# Patient Record
Sex: FEMALE | Race: WHITE | Employment: OTHER | URBAN - METROPOLITAN AREA
[De-identification: names, ages, dates, MRNs, and addresses within clinical notes are randomized per-mention and may not be internally consistent; named-entity substitution may affect disease eponyms.]

---

## 2017-04-04 PROBLEM — M81.0 AGE-RELATED OSTEOPOROSIS WITHOUT CURRENT PATHOLOGICAL FRACTURE: Status: ACTIVE | Noted: 2017-04-04

## 2018-01-01 ENCOUNTER — ANESTHESIA (OUTPATIENT)
Dept: SURGERY | Age: 83
DRG: 481 | End: 2018-01-01
Payer: COMMERCIAL

## 2018-01-01 ENCOUNTER — ANESTHESIA EVENT (OUTPATIENT)
Dept: SURGERY | Age: 83
DRG: 481 | End: 2018-01-01
Payer: COMMERCIAL

## 2018-01-01 ENCOUNTER — APPOINTMENT (OUTPATIENT)
Dept: GENERAL RADIOLOGY | Age: 83
DRG: 481 | End: 2018-01-01
Attending: ORTHOPAEDIC SURGERY
Payer: COMMERCIAL

## 2018-01-01 ENCOUNTER — APPOINTMENT (OUTPATIENT)
Dept: MRI IMAGING | Age: 83
DRG: 481 | End: 2018-01-01
Attending: NURSE PRACTITIONER
Payer: COMMERCIAL

## 2018-01-01 ENCOUNTER — APPOINTMENT (OUTPATIENT)
Dept: GENERAL RADIOLOGY | Age: 83
DRG: 481 | End: 2018-01-01
Attending: EMERGENCY MEDICINE
Payer: COMMERCIAL

## 2018-01-01 ENCOUNTER — HOSPITAL ENCOUNTER (INPATIENT)
Age: 83
LOS: 2 days | Discharge: NURSING, REHAB OR LONG-TERM CARE | DRG: 481 | End: 2018-11-27
Attending: EMERGENCY MEDICINE | Admitting: HOSPITALIST
Payer: COMMERCIAL

## 2018-01-01 VITALS
WEIGHT: 173 LBS | OXYGEN SATURATION: 93 % | RESPIRATION RATE: 20 BRPM | BODY MASS INDEX: 27.8 KG/M2 | DIASTOLIC BLOOD PRESSURE: 68 MMHG | HEIGHT: 66 IN | HEART RATE: 85 BPM | TEMPERATURE: 97.8 F | SYSTOLIC BLOOD PRESSURE: 107 MMHG

## 2018-01-01 DIAGNOSIS — S80.02XA CONTUSION OF LEFT KNEE, INITIAL ENCOUNTER: ICD-10-CM

## 2018-01-01 DIAGNOSIS — S72.002A CLOSED LEFT HIP FRACTURE, INITIAL ENCOUNTER (HCC): Primary | ICD-10-CM

## 2018-01-01 LAB
25(OH)D3+25(OH)D2 SERPL-MCNC: 4.2 NG/ML (ref 30–100)
ABO + RH BLD: NORMAL
ANION GAP SERPL CALC-SCNC: 5 MMOL/L (ref 7–16)
ANION GAP SERPL CALC-SCNC: 8 MMOL/L (ref 7–16)
APPEARANCE UR: ABNORMAL
APTT PPP: <20 SEC (ref 23.2–35.3)
ATRIAL RATE: 86 BPM
BACTERIA SPEC CULT: ABNORMAL
BACTERIA SPEC CULT: ABNORMAL
BACTERIA URNS QL MICRO: 0 /HPF
BASOPHILS # BLD: 0.1 K/UL (ref 0–0.2)
BASOPHILS # BLD: 0.1 K/UL (ref 0–0.2)
BASOPHILS NFR BLD: 0 % (ref 0–2)
BASOPHILS NFR BLD: 0 % (ref 0–2)
BILIRUB UR QL: ABNORMAL
BLOOD GROUP ANTIBODIES SERPL: NORMAL
BUN SERPL-MCNC: 31 MG/DL (ref 8–23)
BUN SERPL-MCNC: 36 MG/DL (ref 8–23)
CALCIUM SERPL-MCNC: 7.6 MG/DL (ref 8.3–10.4)
CALCIUM SERPL-MCNC: 8.2 MG/DL (ref 8.3–10.4)
CALCIUM SERPL-MCNC: 8.3 MG/DL (ref 8.3–10.4)
CALCULATED P AXIS, ECG09: 76 DEGREES
CALCULATED R AXIS, ECG10: 36 DEGREES
CALCULATED T AXIS, ECG11: 60 DEGREES
CASTS URNS QL MICRO: ABNORMAL /LPF
CHLORIDE SERPL-SCNC: 105 MMOL/L (ref 98–107)
CHLORIDE SERPL-SCNC: 107 MMOL/L (ref 98–107)
CO2 SERPL-SCNC: 28 MMOL/L (ref 21–32)
CO2 SERPL-SCNC: 30 MMOL/L (ref 21–32)
COLOR UR: ABNORMAL
CREAT SERPL-MCNC: 0.82 MG/DL (ref 0.6–1)
CREAT SERPL-MCNC: 1.22 MG/DL (ref 0.6–1)
DIAGNOSIS, 93000: NORMAL
DIFFERENTIAL METHOD BLD: ABNORMAL
DIFFERENTIAL METHOD BLD: ABNORMAL
EOSINOPHIL # BLD: 0 K/UL (ref 0–0.8)
EOSINOPHIL # BLD: 0.4 K/UL (ref 0–0.8)
EOSINOPHIL NFR BLD: 0 % (ref 0.5–7.8)
EOSINOPHIL NFR BLD: 3 % (ref 0.5–7.8)
EPI CELLS #/AREA URNS HPF: ABNORMAL /HPF
ERYTHROCYTE [DISTWIDTH] IN BLOOD BY AUTOMATED COUNT: 12.8 %
ERYTHROCYTE [DISTWIDTH] IN BLOOD BY AUTOMATED COUNT: 12.9 %
ERYTHROCYTE [DISTWIDTH] IN BLOOD BY AUTOMATED COUNT: 13.2 %
GLUCOSE SERPL-MCNC: 103 MG/DL (ref 65–100)
GLUCOSE SERPL-MCNC: 141 MG/DL (ref 65–100)
GLUCOSE UR STRIP.AUTO-MCNC: NEGATIVE MG/DL
HCT VFR BLD AUTO: 28.5 % (ref 35.8–46.3)
HCT VFR BLD AUTO: 30.2 % (ref 35.8–46.3)
HCT VFR BLD AUTO: 44.9 % (ref 35.8–46.3)
HGB BLD-MCNC: 10 G/DL (ref 11.7–15.4)
HGB BLD-MCNC: 14.6 G/DL (ref 11.7–15.4)
HGB BLD-MCNC: 8.9 G/DL (ref 11.7–15.4)
HGB UR QL STRIP: ABNORMAL
IMM GRANULOCYTES # BLD: 0.1 K/UL (ref 0–0.5)
IMM GRANULOCYTES # BLD: 0.1 K/UL (ref 0–0.5)
IMM GRANULOCYTES NFR BLD AUTO: 0 % (ref 0–5)
IMM GRANULOCYTES NFR BLD AUTO: 1 % (ref 0–5)
INR PPP: 0.9
KETONES UR QL STRIP.AUTO: ABNORMAL MG/DL
LEUKOCYTE ESTERASE UR QL STRIP.AUTO: NEGATIVE
LYMPHOCYTES # BLD: 2.2 K/UL (ref 0.5–4.6)
LYMPHOCYTES # BLD: 2.3 K/UL (ref 0.5–4.6)
LYMPHOCYTES NFR BLD: 19 % (ref 13–44)
LYMPHOCYTES NFR BLD: 19 % (ref 13–44)
MAGNESIUM SERPL-MCNC: 2.1 MG/DL (ref 1.8–2.4)
MCH RBC QN AUTO: 30.6 PG (ref 26.1–32.9)
MCH RBC QN AUTO: 30.8 PG (ref 26.1–32.9)
MCH RBC QN AUTO: 31.1 PG (ref 26.1–32.9)
MCHC RBC AUTO-ENTMCNC: 30.8 G/DL (ref 31.4–35)
MCHC RBC AUTO-ENTMCNC: 31.2 G/DL (ref 31.4–35)
MCHC RBC AUTO-ENTMCNC: 32.5 G/DL (ref 31.4–35)
MCV RBC AUTO: 95.5 FL (ref 79.6–97.8)
MCV RBC AUTO: 98.6 FL (ref 79.6–97.8)
MCV RBC AUTO: 99.3 FL (ref 79.6–97.8)
MM INDURATION POC: 0 MM (ref 0–5)
MM INDURATION POC: 0 MM (ref 0–5)
MONOCYTES # BLD: 0.8 K/UL (ref 0.1–1.3)
MONOCYTES # BLD: 0.9 K/UL (ref 0.1–1.3)
MONOCYTES NFR BLD: 6 % (ref 4–12)
MONOCYTES NFR BLD: 7 % (ref 4–12)
NEUTS SEG # BLD: 8.5 K/UL (ref 1.7–8.2)
NEUTS SEG # BLD: 8.7 K/UL (ref 1.7–8.2)
NEUTS SEG NFR BLD: 70 % (ref 43–78)
NEUTS SEG NFR BLD: 74 % (ref 43–78)
NITRITE UR QL STRIP.AUTO: NEGATIVE
NRBC # BLD: 0 K/UL (ref 0–0.2)
P-R INTERVAL, ECG05: 152 MS
PH UR STRIP: 5.5 [PH] (ref 5–9)
PLATELET # BLD AUTO: 179 K/UL (ref 150–450)
PLATELET # BLD AUTO: 186 K/UL (ref 150–450)
PLATELET # BLD AUTO: 210 K/UL (ref 150–450)
PMV BLD AUTO: 10.8 FL (ref 9.4–12.3)
PMV BLD AUTO: 11.2 FL (ref 9.4–12.3)
PMV BLD AUTO: 11.5 FL (ref 9.4–12.3)
POTASSIUM SERPL-SCNC: 3.8 MMOL/L (ref 3.5–5.1)
POTASSIUM SERPL-SCNC: 4.1 MMOL/L (ref 3.5–5.1)
PPD POC: NEGATIVE NEGATIVE
PPD POC: NEGATIVE NEGATIVE
PREALB SERPL-MCNC: 16.3 MG/DL (ref 18–35.7)
PROT UR STRIP-MCNC: 30 MG/DL
PROTHROMBIN TIME: 12.1 SEC (ref 11.5–14.5)
PTH-INTACT SERPL-MCNC: 219.5 PG/ML (ref 18.5–88)
Q-T INTERVAL, ECG07: 328 MS
QRS DURATION, ECG06: 80 MS
QTC CALCULATION (BEZET), ECG08: 416 MS
RBC # BLD AUTO: 2.89 M/UL (ref 4.05–5.2)
RBC # BLD AUTO: 3.04 M/UL (ref 4.05–5.2)
RBC # BLD AUTO: 4.7 M/UL (ref 4.05–5.2)
RBC #/AREA URNS HPF: ABNORMAL /HPF
SERVICE CMNT-IMP: ABNORMAL
SERVICE CMNT-IMP: ABNORMAL
SODIUM SERPL-SCNC: 141 MMOL/L (ref 136–145)
SODIUM SERPL-SCNC: 142 MMOL/L (ref 136–145)
SP GR UR REFRACTOMETRY: 1.03 (ref 1–1.02)
SPECIMEN EXP DATE BLD: NORMAL
UROBILINOGEN UR QL STRIP.AUTO: 1 EU/DL (ref 0.2–1)
VENTRICULAR RATE, ECG03: 97 BPM
WBC # BLD AUTO: 10.8 K/UL (ref 4.3–11.1)
WBC # BLD AUTO: 11.9 K/UL (ref 4.3–11.1)
WBC # BLD AUTO: 12.1 K/UL (ref 4.3–11.1)
WBC URNS QL MICRO: ABNORMAL /HPF

## 2018-01-01 PROCEDURE — 74011250636 HC RX REV CODE- 250/636: Performed by: HOSPITALIST

## 2018-01-01 PROCEDURE — 51702 INSERT TEMP BLADDER CATH: CPT | Performed by: EMERGENCY MEDICINE

## 2018-01-01 PROCEDURE — 74011250637 HC RX REV CODE- 250/637: Performed by: NURSE PRACTITIONER

## 2018-01-01 PROCEDURE — 94760 N-INVAS EAR/PLS OXIMETRY 1: CPT

## 2018-01-01 PROCEDURE — 71045 X-RAY EXAM CHEST 1 VIEW: CPT

## 2018-01-01 PROCEDURE — 97530 THERAPEUTIC ACTIVITIES: CPT

## 2018-01-01 PROCEDURE — 74011000258 HC RX REV CODE- 258: Performed by: HOSPITALIST

## 2018-01-01 PROCEDURE — 94640 AIRWAY INHALATION TREATMENT: CPT

## 2018-01-01 PROCEDURE — 74011250636 HC RX REV CODE- 250/636: Performed by: NURSE PRACTITIONER

## 2018-01-01 PROCEDURE — 77030018836 HC SOL IRR NACL ICUM -A: Performed by: ORTHOPAEDIC SURGERY

## 2018-01-01 PROCEDURE — 77010033678 HC OXYGEN DAILY

## 2018-01-01 PROCEDURE — 77030014405 HC GD ROD RMR SYNT -C: Performed by: ORTHOPAEDIC SURGERY

## 2018-01-01 PROCEDURE — 36415 COLL VENOUS BLD VENIPUNCTURE: CPT

## 2018-01-01 PROCEDURE — 80048 BASIC METABOLIC PNL TOTAL CA: CPT

## 2018-01-01 PROCEDURE — 76010000160 HC OR TIME 0.5 TO 1 HR INTENSV-TIER 1: Performed by: ORTHOPAEDIC SURGERY

## 2018-01-01 PROCEDURE — 0QS706Z REPOSITION LEFT UPPER FEMUR WITH INTRAMEDULLARY INTERNAL FIXATION DEVICE, OPEN APPROACH: ICD-10-PCS | Performed by: ORTHOPAEDIC SURGERY

## 2018-01-01 PROCEDURE — 74011000258 HC RX REV CODE- 258: Performed by: NURSE PRACTITIONER

## 2018-01-01 PROCEDURE — 73502 X-RAY EXAM HIP UNI 2-3 VIEWS: CPT

## 2018-01-01 PROCEDURE — 74011000250 HC RX REV CODE- 250: Performed by: NURSE PRACTITIONER

## 2018-01-01 PROCEDURE — 74011636637 HC RX REV CODE- 636/637: Performed by: HOSPITALIST

## 2018-01-01 PROCEDURE — 97166 OT EVAL MOD COMPLEX 45 MIN: CPT

## 2018-01-01 PROCEDURE — C1769 GUIDE WIRE: HCPCS | Performed by: ORTHOPAEDIC SURGERY

## 2018-01-01 PROCEDURE — 85025 COMPLETE CBC W/AUTO DIFF WBC: CPT

## 2018-01-01 PROCEDURE — 73721 MRI JNT OF LWR EXTRE W/O DYE: CPT

## 2018-01-01 PROCEDURE — 74011250636 HC RX REV CODE- 250/636

## 2018-01-01 PROCEDURE — 77030008467 HC STPLR SKN COVD -B: Performed by: ORTHOPAEDIC SURGERY

## 2018-01-01 PROCEDURE — 97110 THERAPEUTIC EXERCISES: CPT

## 2018-01-01 PROCEDURE — 74011000302 HC RX REV CODE- 302: Performed by: HOSPITALIST

## 2018-01-01 PROCEDURE — L1830 KO IMMOB CANVAS LONG PRE OTS: HCPCS | Performed by: ORTHOPAEDIC SURGERY

## 2018-01-01 PROCEDURE — 74011000250 HC RX REV CODE- 250: Performed by: HOSPITALIST

## 2018-01-01 PROCEDURE — 87088 URINE BACTERIA CULTURE: CPT

## 2018-01-01 PROCEDURE — 93005 ELECTROCARDIOGRAM TRACING: CPT | Performed by: EMERGENCY MEDICINE

## 2018-01-01 PROCEDURE — 77030020255 HC SOL INJ LR 1000ML BG

## 2018-01-01 PROCEDURE — 65270000029 HC RM PRIVATE

## 2018-01-01 PROCEDURE — 83970 ASSAY OF PARATHORMONE: CPT

## 2018-01-01 PROCEDURE — 87641 MR-STAPH DNA AMP PROBE: CPT

## 2018-01-01 PROCEDURE — 77030007880 HC KT SPN EPDRL BBMI -B: Performed by: ANESTHESIOLOGY

## 2018-01-01 PROCEDURE — 74011250637 HC RX REV CODE- 250/637: Performed by: HOSPITALIST

## 2018-01-01 PROCEDURE — 77030003665 HC NDL SPN BBMI -A: Performed by: ANESTHESIOLOGY

## 2018-01-01 PROCEDURE — 87086 URINE CULTURE/COLONY COUNT: CPT

## 2018-01-01 PROCEDURE — 97116 GAIT TRAINING THERAPY: CPT

## 2018-01-01 PROCEDURE — 97162 PT EVAL MOD COMPLEX 30 MIN: CPT

## 2018-01-01 PROCEDURE — 74011250636 HC RX REV CODE- 250/636: Performed by: ANESTHESIOLOGY

## 2018-01-01 PROCEDURE — 85027 COMPLETE CBC AUTOMATED: CPT

## 2018-01-01 PROCEDURE — 73552 X-RAY EXAM OF FEMUR 2/>: CPT

## 2018-01-01 PROCEDURE — 83735 ASSAY OF MAGNESIUM: CPT

## 2018-01-01 PROCEDURE — 87186 SC STD MICRODIL/AGAR DIL: CPT

## 2018-01-01 PROCEDURE — 81001 URINALYSIS AUTO W/SCOPE: CPT

## 2018-01-01 PROCEDURE — 76060000033 HC ANESTHESIA 1 TO 1.5 HR: Performed by: ORTHOPAEDIC SURGERY

## 2018-01-01 PROCEDURE — 77030002933 HC SUT MCRYL J&J -A: Performed by: ORTHOPAEDIC SURGERY

## 2018-01-01 PROCEDURE — 86901 BLOOD TYPING SEROLOGIC RH(D): CPT

## 2018-01-01 PROCEDURE — 0T9B70Z DRAINAGE OF BLADDER WITH DRAINAGE DEVICE, VIA NATURAL OR ARTIFICIAL OPENING: ICD-10-PCS | Performed by: EMERGENCY MEDICINE

## 2018-01-01 PROCEDURE — 82306 VITAMIN D 25 HYDROXY: CPT

## 2018-01-01 PROCEDURE — 77030005515 HC CATH URETH FOL14 BARD -B

## 2018-01-01 PROCEDURE — 97535 SELF CARE MNGMENT TRAINING: CPT

## 2018-01-01 PROCEDURE — C1713 ANCHOR/SCREW BN/BN,TIS/BN: HCPCS | Performed by: ORTHOPAEDIC SURGERY

## 2018-01-01 PROCEDURE — 85730 THROMBOPLASTIN TIME PARTIAL: CPT

## 2018-01-01 PROCEDURE — 73590 X-RAY EXAM OF LOWER LEG: CPT

## 2018-01-01 PROCEDURE — 84134 ASSAY OF PREALBUMIN: CPT

## 2018-01-01 PROCEDURE — 73560 X-RAY EXAM OF KNEE 1 OR 2: CPT

## 2018-01-01 PROCEDURE — 96374 THER/PROPH/DIAG INJ IV PUSH: CPT | Performed by: EMERGENCY MEDICINE

## 2018-01-01 PROCEDURE — 86580 TB INTRADERMAL TEST: CPT | Performed by: HOSPITALIST

## 2018-01-01 PROCEDURE — 99285 EMERGENCY DEPT VISIT HI MDM: CPT | Performed by: EMERGENCY MEDICINE

## 2018-01-01 PROCEDURE — 76210000016 HC OR PH I REC 1 TO 1.5 HR: Performed by: ORTHOPAEDIC SURGERY

## 2018-01-01 PROCEDURE — 77030020263 HC SOL INJ SOD CL0.9% LFCR 1000ML

## 2018-01-01 PROCEDURE — 85610 PROTHROMBIN TIME: CPT

## 2018-01-01 PROCEDURE — 74011250636 HC RX REV CODE- 250/636: Performed by: EMERGENCY MEDICINE

## 2018-01-01 PROCEDURE — 74011000250 HC RX REV CODE- 250

## 2018-01-01 DEVICE — NAIL IM L400MM DIA12MM 130DEG LNG L PROX FEM GRN TI CANN: Type: IMPLANTABLE DEVICE | Site: FEMUR | Status: FUNCTIONAL

## 2018-01-01 DEVICE — IMPLANTABLE DEVICE: Type: IMPLANTABLE DEVICE | Site: FEMUR | Status: FUNCTIONAL

## 2018-01-01 RX ORDER — SODIUM CHLORIDE 0.9 % (FLUSH) 0.9 %
5-10 SYRINGE (ML) INJECTION EVERY 8 HOURS
Status: DISCONTINUED | OUTPATIENT
Start: 2018-01-01 | End: 2018-01-01 | Stop reason: HOSPADM

## 2018-01-01 RX ORDER — LEVOTHYROXINE SODIUM 125 UG/1
125 TABLET ORAL
Status: DISCONTINUED | OUTPATIENT
Start: 2018-01-01 | End: 2018-01-01 | Stop reason: HOSPADM

## 2018-01-01 RX ORDER — BUDESONIDE 0.5 MG/2ML
500 INHALANT ORAL 2 TIMES DAILY
Qty: 30 EACH | Refills: 0 | Status: SHIPPED
Start: 2018-01-01

## 2018-01-01 RX ORDER — SODIUM CHLORIDE 0.9 % (FLUSH) 0.9 %
5-10 SYRINGE (ML) INJECTION EVERY 8 HOURS
Status: DISCONTINUED | OUTPATIENT
Start: 2018-01-01 | End: 2018-01-01 | Stop reason: SDUPTHER

## 2018-01-01 RX ORDER — EPHEDRINE SULFATE 50 MG/ML
25 INJECTION, SOLUTION INTRAVENOUS ONCE
Status: DISCONTINUED | OUTPATIENT
Start: 2018-01-01 | End: 2018-01-01

## 2018-01-01 RX ORDER — ONDANSETRON 2 MG/ML
4 INJECTION INTRAMUSCULAR; INTRAVENOUS
Status: DISCONTINUED | OUTPATIENT
Start: 2018-01-01 | End: 2018-01-01 | Stop reason: HOSPADM

## 2018-01-01 RX ORDER — PREDNISONE 10 MG/1
TABLET ORAL
Qty: 7 TAB | Refills: 0 | Status: SHIPPED
Start: 2018-01-01

## 2018-01-01 RX ORDER — HYDROMORPHONE HYDROCHLORIDE 1 MG/ML
0.5 INJECTION, SOLUTION INTRAMUSCULAR; INTRAVENOUS; SUBCUTANEOUS
Status: COMPLETED | OUTPATIENT
Start: 2018-01-01 | End: 2018-01-01

## 2018-01-01 RX ORDER — IPRATROPIUM BROMIDE AND ALBUTEROL SULFATE 2.5; .5 MG/3ML; MG/3ML
3 SOLUTION RESPIRATORY (INHALATION)
Status: DISCONTINUED | OUTPATIENT
Start: 2018-01-01 | End: 2018-01-01 | Stop reason: HOSPADM

## 2018-01-01 RX ORDER — CEFAZOLIN SODIUM IN 0.9 % NACL 2 G/50 ML
2 INTRAVENOUS SOLUTION, PIGGYBACK (ML) INTRAVENOUS
Status: DISCONTINUED | OUTPATIENT
Start: 2018-01-01 | End: 2018-01-01 | Stop reason: SDUPTHER

## 2018-01-01 RX ORDER — CEFAZOLIN SODIUM/WATER 2 G/20 ML
2 SYRINGE (ML) INTRAVENOUS ONCE
Status: COMPLETED | OUTPATIENT
Start: 2018-01-01 | End: 2018-01-01

## 2018-01-01 RX ORDER — OXYCODONE HYDROCHLORIDE 5 MG/1
5 TABLET ORAL
Status: DISCONTINUED | OUTPATIENT
Start: 2018-01-01 | End: 2018-01-01 | Stop reason: HOSPADM

## 2018-01-01 RX ORDER — BENZONATATE 100 MG/1
100 CAPSULE ORAL
Qty: 30 CAP | Refills: 0 | Status: SHIPPED
Start: 2018-01-01 | End: 2018-01-01

## 2018-01-01 RX ORDER — FERROUS SULFATE, DRIED 160(50) MG
1 TABLET, EXTENDED RELEASE ORAL
Qty: 90 TAB | Refills: 0 | Status: SHIPPED
Start: 2018-01-01

## 2018-01-01 RX ORDER — MIDAZOLAM HYDROCHLORIDE 1 MG/ML
5 INJECTION, SOLUTION INTRAMUSCULAR; INTRAVENOUS ONCE
Status: DISCONTINUED | OUTPATIENT
Start: 2018-01-01 | End: 2018-01-01 | Stop reason: HOSPADM

## 2018-01-01 RX ORDER — SODIUM CHLORIDE, SODIUM LACTATE, POTASSIUM CHLORIDE, CALCIUM CHLORIDE 600; 310; 30; 20 MG/100ML; MG/100ML; MG/100ML; MG/100ML
75 INJECTION, SOLUTION INTRAVENOUS
Status: DISPENSED | OUTPATIENT
Start: 2018-01-01 | End: 2018-01-01

## 2018-01-01 RX ORDER — SODIUM CHLORIDE, SODIUM LACTATE, POTASSIUM CHLORIDE, CALCIUM CHLORIDE 600; 310; 30; 20 MG/100ML; MG/100ML; MG/100ML; MG/100ML
75 INJECTION, SOLUTION INTRAVENOUS CONTINUOUS
Status: DISCONTINUED | OUTPATIENT
Start: 2018-01-01 | End: 2018-01-01 | Stop reason: HOSPADM

## 2018-01-01 RX ORDER — PREDNISONE 20 MG/1
20 TABLET ORAL
Status: DISCONTINUED | OUTPATIENT
Start: 2018-01-01 | End: 2018-01-01 | Stop reason: HOSPADM

## 2018-01-01 RX ORDER — PROPOFOL 10 MG/ML
INJECTION, EMULSION INTRAVENOUS
Status: DISCONTINUED | OUTPATIENT
Start: 2018-01-01 | End: 2018-01-01 | Stop reason: HOSPADM

## 2018-01-01 RX ORDER — SODIUM CHLORIDE 9 MG/ML
75 INJECTION, SOLUTION INTRAVENOUS CONTINUOUS
Status: DISCONTINUED | OUTPATIENT
Start: 2018-01-01 | End: 2018-01-01 | Stop reason: SDUPTHER

## 2018-01-01 RX ORDER — ACETAMINOPHEN 325 MG/1
650 TABLET ORAL EVERY 8 HOURS
Status: DISCONTINUED | OUTPATIENT
Start: 2018-01-01 | End: 2018-01-01 | Stop reason: HOSPADM

## 2018-01-01 RX ORDER — PROPOFOL 10 MG/ML
INJECTION, EMULSION INTRAVENOUS AS NEEDED
Status: DISCONTINUED | OUTPATIENT
Start: 2018-01-01 | End: 2018-01-01 | Stop reason: HOSPADM

## 2018-01-01 RX ORDER — BUPIVACAINE HYDROCHLORIDE 7.5 MG/ML
INJECTION, SOLUTION EPIDURAL; RETROBULBAR
Status: COMPLETED | OUTPATIENT
Start: 2018-01-01 | End: 2018-01-01

## 2018-01-01 RX ORDER — ENOXAPARIN SODIUM 100 MG/ML
30 INJECTION SUBCUTANEOUS EVERY 24 HOURS
Qty: 26 SYRINGE | Refills: 0 | Status: SHIPPED
Start: 2018-01-01

## 2018-01-01 RX ORDER — MIDAZOLAM HYDROCHLORIDE 1 MG/ML
2 INJECTION, SOLUTION INTRAMUSCULAR; INTRAVENOUS
Status: DISCONTINUED | OUTPATIENT
Start: 2018-01-01 | End: 2018-01-01 | Stop reason: HOSPADM

## 2018-01-01 RX ORDER — TRAMADOL HYDROCHLORIDE 50 MG/1
50 TABLET ORAL
Status: DISCONTINUED | OUTPATIENT
Start: 2018-01-01 | End: 2018-01-01 | Stop reason: HOSPADM

## 2018-01-01 RX ORDER — MAG HYDROX/ALUMINUM HYD/SIMETH 200-200-20
30 SUSPENSION, ORAL (FINAL DOSE FORM) ORAL
Status: DISCONTINUED | OUTPATIENT
Start: 2018-01-01 | End: 2018-01-01 | Stop reason: HOSPADM

## 2018-01-01 RX ORDER — ENOXAPARIN SODIUM 100 MG/ML
30 INJECTION SUBCUTANEOUS EVERY 24 HOURS
Status: DISCONTINUED | OUTPATIENT
Start: 2018-01-01 | End: 2018-01-01 | Stop reason: HOSPADM

## 2018-01-01 RX ORDER — DOCUSATE SODIUM 100 MG/1
100 CAPSULE, LIQUID FILLED ORAL 2 TIMES DAILY
Qty: 60 CAP | Refills: 2 | Status: SHIPPED
Start: 2018-01-01

## 2018-01-01 RX ORDER — EPHEDRINE SULFATE 50 MG/ML
INJECTION, SOLUTION INTRAVENOUS AS NEEDED
Status: DISCONTINUED | OUTPATIENT
Start: 2018-01-01 | End: 2018-01-01 | Stop reason: HOSPADM

## 2018-01-01 RX ORDER — BENZONATATE 100 MG/1
100 CAPSULE ORAL
Status: DISCONTINUED | OUTPATIENT
Start: 2018-01-01 | End: 2018-01-01 | Stop reason: HOSPADM

## 2018-01-01 RX ORDER — IPRATROPIUM BROMIDE AND ALBUTEROL SULFATE 2.5; .5 MG/3ML; MG/3ML
3 SOLUTION RESPIRATORY (INHALATION)
Qty: 30 NEBULE | Refills: 0 | Status: SHIPPED
Start: 2018-01-01

## 2018-01-01 RX ORDER — DOCUSATE SODIUM 100 MG/1
100 CAPSULE, LIQUID FILLED ORAL 2 TIMES DAILY
Status: DISCONTINUED | OUTPATIENT
Start: 2018-01-01 | End: 2018-01-01 | Stop reason: HOSPADM

## 2018-01-01 RX ORDER — HYDROCODONE BITARTRATE AND ACETAMINOPHEN 5; 325 MG/1; MG/1
2 TABLET ORAL AS NEEDED
Status: DISCONTINUED | OUTPATIENT
Start: 2018-01-01 | End: 2018-01-01 | Stop reason: HOSPADM

## 2018-01-01 RX ORDER — GUAIFENESIN 600 MG/1
1200 TABLET, EXTENDED RELEASE ORAL EVERY 12 HOURS
Status: DISCONTINUED | OUTPATIENT
Start: 2018-01-01 | End: 2018-01-01 | Stop reason: HOSPADM

## 2018-01-01 RX ORDER — SODIUM CHLORIDE 9 MG/ML
100 INJECTION, SOLUTION INTRAVENOUS CONTINUOUS
Status: DISCONTINUED | OUTPATIENT
Start: 2018-01-01 | End: 2018-01-01 | Stop reason: HOSPADM

## 2018-01-01 RX ORDER — HYDROMORPHONE HYDROCHLORIDE 2 MG/ML
0.5 INJECTION, SOLUTION INTRAMUSCULAR; INTRAVENOUS; SUBCUTANEOUS
Status: DISCONTINUED | OUTPATIENT
Start: 2018-01-01 | End: 2018-01-01 | Stop reason: HOSPADM

## 2018-01-01 RX ORDER — LIDOCAINE HYDROCHLORIDE 10 MG/ML
0.1 INJECTION INFILTRATION; PERINEURAL AS NEEDED
Status: DISCONTINUED | OUTPATIENT
Start: 2018-01-01 | End: 2018-01-01 | Stop reason: HOSPADM

## 2018-01-01 RX ORDER — HYDROMORPHONE HYDROCHLORIDE 1 MG/ML
0.5 INJECTION, SOLUTION INTRAMUSCULAR; INTRAVENOUS; SUBCUTANEOUS
Status: DISCONTINUED | OUTPATIENT
Start: 2018-01-01 | End: 2018-01-01

## 2018-01-01 RX ORDER — ACETAMINOPHEN 325 MG/1
650 TABLET ORAL
Qty: 30 TAB | Refills: 0 | Status: SHIPPED
Start: 2018-01-01

## 2018-01-01 RX ORDER — LISINOPRIL 5 MG/1
5 TABLET ORAL DAILY
Status: DISCONTINUED | OUTPATIENT
Start: 2018-01-01 | End: 2018-01-01 | Stop reason: HOSPADM

## 2018-01-01 RX ORDER — SODIUM CHLORIDE 0.9 % (FLUSH) 0.9 %
5-10 SYRINGE (ML) INJECTION AS NEEDED
Status: DISCONTINUED | OUTPATIENT
Start: 2018-01-01 | End: 2018-01-01 | Stop reason: SDUPTHER

## 2018-01-01 RX ORDER — EPHEDRINE SULFATE/0.9% NACL/PF 25 MG/5 ML
10 SYRINGE (ML) INTRAVENOUS ONCE
Status: DISCONTINUED | OUTPATIENT
Start: 2018-01-01 | End: 2018-01-01

## 2018-01-01 RX ORDER — CEFPODOXIME PROXETIL 100 MG/1
100 TABLET, FILM COATED ORAL 2 TIMES DAILY
Qty: 6 TAB | Refills: 0 | Status: SHIPPED
Start: 2018-01-01 | End: 2018-01-01

## 2018-01-01 RX ORDER — SODIUM CHLORIDE 0.9 % (FLUSH) 0.9 %
5-10 SYRINGE (ML) INJECTION AS NEEDED
Status: DISCONTINUED | OUTPATIENT
Start: 2018-01-01 | End: 2018-01-01 | Stop reason: HOSPADM

## 2018-01-01 RX ORDER — TRAMADOL HYDROCHLORIDE 50 MG/1
100 TABLET ORAL
Qty: 30 TAB | Refills: 0 | Status: SHIPPED | OUTPATIENT
Start: 2018-01-01

## 2018-01-01 RX ORDER — FERROUS SULFATE, DRIED 160(50) MG
1 TABLET, EXTENDED RELEASE ORAL
Status: DISCONTINUED | OUTPATIENT
Start: 2018-01-01 | End: 2018-01-01 | Stop reason: HOSPADM

## 2018-01-01 RX ORDER — GUAIFENESIN/DEXTROMETHORPHAN 100-10MG/5
10 SYRUP ORAL
Status: DISCONTINUED | OUTPATIENT
Start: 2018-01-01 | End: 2018-01-01

## 2018-01-01 RX ORDER — LIDOCAINE HYDROCHLORIDE 20 MG/ML
INJECTION, SOLUTION EPIDURAL; INFILTRATION; INTRACAUDAL; PERINEURAL AS NEEDED
Status: DISCONTINUED | OUTPATIENT
Start: 2018-01-01 | End: 2018-01-01 | Stop reason: HOSPADM

## 2018-01-01 RX ORDER — FENTANYL CITRATE 50 UG/ML
100 INJECTION, SOLUTION INTRAMUSCULAR; INTRAVENOUS ONCE
Status: DISCONTINUED | OUTPATIENT
Start: 2018-01-01 | End: 2018-01-01 | Stop reason: HOSPADM

## 2018-01-01 RX ORDER — BUDESONIDE 0.5 MG/2ML
500 INHALANT ORAL
Status: DISCONTINUED | OUTPATIENT
Start: 2018-01-01 | End: 2018-01-01 | Stop reason: HOSPADM

## 2018-01-01 RX ADMIN — PREDNISONE 20 MG: 20 TABLET ORAL at 08:43

## 2018-01-01 RX ADMIN — SODIUM CHLORIDE 1000 MG: 900 INJECTION, SOLUTION INTRAVENOUS at 02:42

## 2018-01-01 RX ADMIN — ENOXAPARIN SODIUM 30 MG: 100 INJECTION SUBCUTANEOUS at 08:43

## 2018-01-01 RX ADMIN — SODIUM CHLORIDE 250 ML: 900 INJECTION, SOLUTION INTRAVENOUS at 04:54

## 2018-01-01 RX ADMIN — GUAIFENESIN 1200 MG: 600 TABLET, EXTENDED RELEASE ORAL at 08:33

## 2018-01-01 RX ADMIN — SODIUM CHLORIDE, SODIUM LACTATE, POTASSIUM CHLORIDE, AND CALCIUM CHLORIDE 75 ML/HR: 600; 310; 30; 20 INJECTION, SOLUTION INTRAVENOUS at 05:12

## 2018-01-01 RX ADMIN — LEVOTHYROXINE SODIUM 125 MCG: 125 TABLET ORAL at 05:10

## 2018-01-01 RX ADMIN — GUAIFENESIN 1200 MG: 600 TABLET, EXTENDED RELEASE ORAL at 20:11

## 2018-01-01 RX ADMIN — BUDESONIDE 500 MCG: 0.5 INHALANT RESPIRATORY (INHALATION) at 09:32

## 2018-01-01 RX ADMIN — Medication 10 ML: at 13:46

## 2018-01-01 RX ADMIN — DOCUSATE SODIUM 100 MG: 100 CAPSULE, LIQUID FILLED ORAL at 18:00

## 2018-01-01 RX ADMIN — TUBERCULIN PURIFIED PROTEIN DERIVATIVE 5 UNITS: 5 INJECTION, SOLUTION INTRADERMAL at 04:40

## 2018-01-01 RX ADMIN — CALCIUM CARBONATE 500 MG (1,250 MG)-VITAMIN D3 200 UNIT TABLET 1 TABLET: at 08:33

## 2018-01-01 RX ADMIN — PROPOFOL 75 MCG/KG/MIN: 10 INJECTION, EMULSION INTRAVENOUS at 10:26

## 2018-01-01 RX ADMIN — Medication 5 ML: at 20:11

## 2018-01-01 RX ADMIN — LEVOTHYROXINE SODIUM 125 MCG: 125 TABLET ORAL at 04:37

## 2018-01-01 RX ADMIN — CALCIUM CARBONATE 500 MG (1,250 MG)-VITAMIN D3 200 UNIT TABLET 1 TABLET: at 11:36

## 2018-01-01 RX ADMIN — SODIUM CHLORIDE, SODIUM LACTATE, POTASSIUM CHLORIDE, AND CALCIUM CHLORIDE 75 ML/HR: 600; 310; 30; 20 INJECTION, SOLUTION INTRAVENOUS at 13:00

## 2018-01-01 RX ADMIN — LEVOTHYROXINE SODIUM 125 MCG: 125 TABLET ORAL at 05:48

## 2018-01-01 RX ADMIN — ONDANSETRON 4 MG: 2 INJECTION INTRAMUSCULAR; INTRAVENOUS at 08:35

## 2018-01-01 RX ADMIN — ACETAMINOPHEN 650 MG: 325 TABLET, FILM COATED ORAL at 04:37

## 2018-01-01 RX ADMIN — Medication 2 G: at 10:40

## 2018-01-01 RX ADMIN — GUAIFENESIN 1200 MG: 600 TABLET, EXTENDED RELEASE ORAL at 20:00

## 2018-01-01 RX ADMIN — GUAIFENESIN 1200 MG: 600 TABLET, EXTENDED RELEASE ORAL at 08:43

## 2018-01-01 RX ADMIN — GUAIFENESIN AND DEXTROMETHORPHAN 10 ML: 100; 10 SYRUP ORAL at 05:48

## 2018-01-01 RX ADMIN — ACETAMINOPHEN 650 MG: 325 TABLET, FILM COATED ORAL at 15:44

## 2018-01-01 RX ADMIN — BUPIVACAINE HYDROCHLORIDE 13.5 MG: 7.5 INJECTION, SOLUTION EPIDURAL; RETROBULBAR at 10:29

## 2018-01-01 RX ADMIN — ACETAMINOPHEN 650 MG: 325 TABLET, FILM COATED ORAL at 05:09

## 2018-01-01 RX ADMIN — HYDROMORPHONE HYDROCHLORIDE 0.5 MG: 1 INJECTION, SOLUTION INTRAMUSCULAR; INTRAVENOUS; SUBCUTANEOUS at 01:58

## 2018-01-01 RX ADMIN — ACETAMINOPHEN 650 MG: 325 TABLET, FILM COATED ORAL at 20:11

## 2018-01-01 RX ADMIN — OXYCODONE HYDROCHLORIDE 5 MG: 5 TABLET ORAL at 02:42

## 2018-01-01 RX ADMIN — Medication 10 ML: at 15:45

## 2018-01-01 RX ADMIN — SODIUM CHLORIDE 1000 MG: 900 INJECTION, SOLUTION INTRAVENOUS at 19:00

## 2018-01-01 RX ADMIN — BUDESONIDE 500 MCG: 0.5 INHALANT RESPIRATORY (INHALATION) at 21:03

## 2018-01-01 RX ADMIN — Medication 10 ML: at 14:00

## 2018-01-01 RX ADMIN — DOCUSATE SODIUM 100 MG: 100 CAPSULE, LIQUID FILLED ORAL at 14:04

## 2018-01-01 RX ADMIN — ACETAMINOPHEN 650 MG: 325 TABLET, FILM COATED ORAL at 13:46

## 2018-01-01 RX ADMIN — EPHEDRINE SULFATE 10 MG: 50 INJECTION, SOLUTION INTRAVENOUS at 10:59

## 2018-01-01 RX ADMIN — CALCIUM CARBONATE 500 MG (1,250 MG)-VITAMIN D3 200 UNIT TABLET 1 TABLET: at 17:33

## 2018-01-01 RX ADMIN — OXYCODONE HYDROCHLORIDE 5 MG: 5 TABLET ORAL at 20:10

## 2018-01-01 RX ADMIN — CALCIUM CARBONATE 500 MG (1,250 MG)-VITAMIN D3 200 UNIT TABLET 1 TABLET: at 14:04

## 2018-01-01 RX ADMIN — OXYCODONE HYDROCHLORIDE 5 MG: 5 TABLET ORAL at 05:01

## 2018-01-01 RX ADMIN — SODIUM CHLORIDE, SODIUM LACTATE, POTASSIUM CHLORIDE, AND CALCIUM CHLORIDE: 600; 310; 30; 20 INJECTION, SOLUTION INTRAVENOUS at 11:01

## 2018-01-01 RX ADMIN — METHYLPREDNISOLONE SODIUM SUCCINATE 40 MG: 40 INJECTION, POWDER, FOR SOLUTION INTRAMUSCULAR; INTRAVENOUS at 04:59

## 2018-01-01 RX ADMIN — DOCUSATE SODIUM 100 MG: 100 CAPSULE, LIQUID FILLED ORAL at 08:34

## 2018-01-01 RX ADMIN — ACETAMINOPHEN 650 MG: 325 TABLET, FILM COATED ORAL at 05:01

## 2018-01-01 RX ADMIN — OXYCODONE HYDROCHLORIDE 5 MG: 5 TABLET ORAL at 11:36

## 2018-01-01 RX ADMIN — CEFTRIAXONE SODIUM 1 G: 1 INJECTION, POWDER, FOR SOLUTION INTRAMUSCULAR; INTRAVENOUS at 05:10

## 2018-01-01 RX ADMIN — ENOXAPARIN SODIUM 30 MG: 100 INJECTION SUBCUTANEOUS at 08:33

## 2018-01-01 RX ADMIN — PROPOFOL 40 MG: 10 INJECTION, EMULSION INTRAVENOUS at 10:24

## 2018-01-01 RX ADMIN — DOCUSATE SODIUM 100 MG: 100 CAPSULE, LIQUID FILLED ORAL at 08:43

## 2018-01-01 RX ADMIN — CEFTRIAXONE SODIUM 1 G: 1 INJECTION, POWDER, FOR SOLUTION INTRAMUSCULAR; INTRAVENOUS at 04:56

## 2018-01-01 RX ADMIN — LISINOPRIL 5 MG: 5 TABLET ORAL at 08:43

## 2018-01-01 RX ADMIN — Medication 5 ML: at 04:38

## 2018-01-01 RX ADMIN — PREDNISONE 20 MG: 20 TABLET ORAL at 08:34

## 2018-01-01 RX ADMIN — CALCIUM CARBONATE 500 MG (1,250 MG)-VITAMIN D3 200 UNIT TABLET 1 TABLET: at 08:43

## 2018-01-01 RX ADMIN — LISINOPRIL 5 MG: 5 TABLET ORAL at 08:34

## 2018-01-01 RX ADMIN — HYDROMORPHONE HYDROCHLORIDE 0.5 MG: 1 INJECTION, SOLUTION INTRAMUSCULAR; INTRAVENOUS; SUBCUTANEOUS at 08:34

## 2018-01-01 RX ADMIN — ACETAMINOPHEN 650 MG: 325 TABLET, FILM COATED ORAL at 14:04

## 2018-01-01 RX ADMIN — Medication 10 ML: at 05:10

## 2018-01-01 RX ADMIN — OXYCODONE HYDROCHLORIDE 5 MG: 5 TABLET ORAL at 05:09

## 2018-01-01 RX ADMIN — CALCIUM CARBONATE 500 MG (1,250 MG)-VITAMIN D3 200 UNIT TABLET 1 TABLET: at 17:00

## 2018-01-01 RX ADMIN — CALCIUM CARBONATE 500 MG (1,250 MG)-VITAMIN D3 200 UNIT TABLET 1 TABLET: at 12:29

## 2018-01-01 RX ADMIN — OXYCODONE HYDROCHLORIDE 5 MG: 5 TABLET ORAL at 20:00

## 2018-01-01 RX ADMIN — BUDESONIDE 500 MCG: 0.5 INHALANT RESPIRATORY (INHALATION) at 07:34

## 2018-01-01 RX ADMIN — LIDOCAINE HYDROCHLORIDE 40 MG: 20 INJECTION, SOLUTION EPIDURAL; INFILTRATION; INTRACAUDAL; PERINEURAL at 10:23

## 2018-01-01 RX ADMIN — ACETAMINOPHEN 650 MG: 325 TABLET, FILM COATED ORAL at 20:00

## 2018-01-01 RX ADMIN — Medication 5 ML: at 20:00

## 2018-01-01 RX ADMIN — BUDESONIDE 500 MCG: 0.5 INHALANT RESPIRATORY (INHALATION) at 20:15

## 2018-01-01 RX ADMIN — CEFTRIAXONE SODIUM 1 G: 1 INJECTION, POWDER, FOR SOLUTION INTRAMUSCULAR; INTRAVENOUS at 04:37

## 2018-01-01 RX ADMIN — DOCUSATE SODIUM 100 MG: 100 CAPSULE, LIQUID FILLED ORAL at 17:33

## 2018-01-01 RX ADMIN — SODIUM CHLORIDE, SODIUM LACTATE, POTASSIUM CHLORIDE, AND CALCIUM CHLORIDE 75 ML/HR: 600; 310; 30; 20 INJECTION, SOLUTION INTRAVENOUS at 09:16

## 2018-01-01 RX ADMIN — IPRATROPIUM BROMIDE AND ALBUTEROL SULFATE 3 ML: .5; 3 SOLUTION RESPIRATORY (INHALATION) at 07:46

## 2018-11-25 PROBLEM — S72.002A CLOSED LEFT HIP FRACTURE (HCC): Status: ACTIVE | Noted: 2018-01-01

## 2018-11-25 PROBLEM — J20.9 ACUTE BRONCHITIS: Status: ACTIVE | Noted: 2018-01-01

## 2018-11-25 PROBLEM — E86.0 DEHYDRATION: Status: ACTIVE | Noted: 2018-01-01

## 2018-11-25 NOTE — PERIOP NOTES
TRANSFER - IN REPORT: 
 
Verbal report received from [facility] on [place]  being received from 7th floor,  for ordered procedure Report consisted of patients Situation, Background, Assessment and  
Recommendations(SBAR). Information from the following report(s) SBAR, Kardex, MAR, Recent Results and Pre Procedure Checklist was reviewed with the receiving nurse. Opportunity for questions and clarification was provided. Assessment completed upon patients arrival to unit and care assumed.

## 2018-11-25 NOTE — PERIOP NOTES
DR PATRICK MADE AWARE OF PTS B/P OF 87/49. DR PATRICK GAVE VERBAL ORDER FOR EPHEDRINE 10 MG IV AND 25MG IM X ONCE.

## 2018-11-25 NOTE — PERIOP NOTES
DR Esther Montes AT BEDSIDE, MADE AWARE OF PTS B/ OF 86/51 AT THIS TIME. NO NEW ORDERS AT THIS TIME.

## 2018-11-25 NOTE — PERIOP NOTES
PTS B/P 104/57 AT THIS TIME. DR PATRICK NOTIFIED.  DR PATRICK STATED TO D/ EPHEDRINE ORDERS AT THIS TIME

## 2018-11-25 NOTE — PROGRESS NOTES
11/25/18 0424 Dual Skin Pressure Injury Assessment Dual Skin Pressure Injury Assessment WDL Second Care Provider (Based on [address] [place] [address]) Birdena Meghan Skin Integumentary Skin Integumentary (WDL) X Skin Color Ecchymosis (comment); Appropriate for ethnicity Skin Condition/Temp Warm;Dry Skin Integrity Intact 
(swelling to LLE near knee, scatterred moles in armpit areas) Hair Growth Present Wound Prevention and Protection Methods Orientation of Wound Prevention Posterior Location of Wound Prevention Sacrum/Coccyx Dressing Present  No  
Wound Offloading (Prevention Methods) Bed, pressure redistribution/air;Bed, pressure reduction mattress;Pillows; Turning;Repositioning

## 2018-11-25 NOTE — ED PROVIDER NOTES
81year old lady presents with concerns about pain and deformity near her left knee after a fall. Patient says that she had no other injury and did not hit her head or lose consciousness. She does not take any blood thinners and says she otherwise has no significant medical problems. Elements of this note were created using speech recognition software. As such, errors of speech recognition may be present. No past medical history on file. No past surgical history on file. No family history on file. Social History Socioeconomic History  Marital status:  Spouse name: Not on file  Number of children: Not on file  Years of education: Not on file  Highest education level: Not on file Social Needs  Financial resource strain: Not on file  Food insecurity - worry: Not on file  Food insecurity - inability: Not on file  Transportation needs - medical: Not on file  Transportation needs - non-medical: Not on file Occupational History  Not on file Tobacco Use  Smoking status: Current Every Day Smoker Types: Cigarettes  Smokeless tobacco: Never Used Substance and Sexual Activity  Alcohol use: No  
 Drug use: No  
 Sexual activity: Not on file Other Topics Concern  Not on file Social History Narrative  Not on file ALLERGIES: Patient has no known allergies. Review of Systems Constitutional: Negative for chills and fever. Musculoskeletal: Positive for arthralgias, gait problem and joint swelling. Skin: Negative for color change and wound. Vitals:  
 11/25/18 0144 BP: 129/82 Pulse: 92 Resp: 18 Temp: 98.3 °F (36.8 °C) Weight: 78.5 kg (173 lb) Height: 5' 6\" (1.676 m) Physical Exam  
Constitutional: She is oriented to person, place, and time. She appears well-developed and well-nourished. HENT:  
Head: Normocephalic and atraumatic. Cardiovascular: Normal rate and regular rhythm. 2+ left dorsalis pedis and posterior tibials pulses Pulmonary/Chest: Effort normal and breath sounds normal.  
Musculoskeletal:  
Obvious deformity in the area of the left knee. difficult to tell what the underlying injury is Neurological: She is alert and oriented to person, place, and time. Skin: Skin is warm and dry. Nursing note and vitals reviewed. MDM Number of Diagnoses or Management Options Closed left hip fracture, initial encounter [facility]):  
Contusion of left knee, initial encounter:  
Diagnosis management comments: I will x-ray her whole left leg to evaluate the injury. X-ray reveals a left hip fracture no obvious fracture related to the knee. Her significant swelling in that area may be related to ligamentous injury. 2:52 AM 
I discussed the case with on-call for the hip fracture service. 3:03 AM 
I paged the hospitalist. 
 
3:03 AM 
I spoke with the hospitalist who kindly agreed to see the patient. Procedures

## 2018-11-25 NOTE — PERIOP NOTES
PS B/P 115/51 BEFORE ADMISTRATION OF ANY MEDICATION WHILE WAITING FOR PHARMACY TO VERIFY EPHEDRINE  AT THIS TIME, WILL RECHECK IN 5 MINS THEN NOTIFY DR PATRICK.

## 2018-11-25 NOTE — PROGRESS NOTES
TRANSFER - IN REPORT: 
 
Verbal report received from [facility] on [place]  being received from ED for routine progression of care Report consisted of patients Situation, Background, Assessment and  
Recommendations(SBAR). Information from the following report(s) SBAR, Kardex and ED Summary was reviewed with the receiving nurse. Opportunity for questions and clarification was provided. Assessment completed upon patients arrival to unit and care assumed.

## 2018-11-25 NOTE — PROGRESS NOTES
Problem: Mobility Impaired (Adult and Pediatric) Goal: *Acute Goals and Plan of Care (Insert Text) STG: 
(1.)Ms. Jennifer Cruz will move from supine to sit and sit to supine , scoot up and down and roll side to side with STAND BY ASSIST within 4 treatment day(s). (2.)Ms. Jennifer Cruz will transfer from bed to chair and chair to bed with MINIMAL ASSIST using the least restrictive device within 4 treatment day(s). (3.)Ms. Jennifer Cruz will ambulate with MINIMAL ASSIST for 5 feet with the least restrictive device NWB within 4 treatment day(s). LTG: 
(1.)Ms. Jennifer Cruz will move from supine to sit and sit to supine , scoot up and down and roll side to side in bed with SUPERVISION within 8 treatment day(s). (2.)Ms. Jennifer Cruz will transfer from bed to chair and chair to bed with STAND BY ASSIST using the least restrictive device within 8 treatment day(s). (3.)Ms. Jennifer Cruz will ambulate with STAND BY ASSIST for 25+ feet with the least restrictive device NWB within 8 treatment day(s). ________________________________________________________________________________________________ PHYSICAL THERAPY: Initial Assessment, Treatment Day: Day of Assessment, PM 11/25/2018 INPATIENT: Hospital Day: 1 Payor: [facility] / Plan: [employer] / Product Type: [employer] Medicare /  
  
NAME/AGE/GENDER: Neftali Martin is a 81 y.o. female PRIMARY DIAGNOSIS: Closed left hip fracture (HCC) Closed left hip fracture (HCC) Closed left hip fracture ([address] Procedure(s) (LRB): FEMUR INSERTION INTRA MEDULLARY NAIL (Left) Day of Surgery ICD-10: Treatment Diagnosis:  
 · Generalized Muscle Weakness (M62.81) · Difficulty in walking, Not elsewhere classified (R26.2) Precaution/Allergies: 
Patient has no known allergies. ASSESSMENT:  
 
Ms. Jennifer Cruz presents supine at arrival stating she does not have any pain. Pt in [facility] 94 with large hematoma on L inferior knee.   She agrees to participate. She was initially able to trnf to sitting, but fell back and needed 2nd attempt with CGA to complete sitting trnf. Sat EOB for 5min, then sit - stand needing John and reminders to be NWB. Pt was compliant with restriction. She stood for 6min then asking for rest sitting EOB for another 5min. Did not trnf to chair due to instructions on \"PT bed mob\" order. Pt returned to bed and applied KI. Her impairments include decreased functional mobility, decreased balance, decreased strength, decreased safety awareness, increased risk for falls. Pt would benefit from further PT while in house to address these impairments to help improve to prior level of independence. Anticipate pt will require continued skilled PT following discharge from hospital due to poor balance, safety and fall risk. Pt has 5 steps to enter house, she presents unsafe with NWB, weakness and safety would be concern at discharge. This section established at most recent assessment PROBLEM LIST (Impairments causing functional limitations): 1. Decreased Strength 2. Decreased ADL/Functional Activities 3. Decreased Transfer Abilities 4. Decreased Ambulation Ability/Technique 5. Decreased Balance 6. Decreased Activity Tolerance 7. Decreased Pacing Skills 8. Increased Fatigue 9. Increased Shortness of Breath 10. Decreased Flexibility/Joint Mobility 11. Decreased Knowledge of Precautions INTERVENTIONS PLANNED: (Benefits and precautions of physical therapy have been discussed with the patient.) 1. Balance Exercise 2. Bed Mobility 3. Gait Training 4. Neuromuscular Re-education/Strengthening 5. Range of Motion (ROM) 6. Therapeutic Activites 7. Therapeutic Exercise/Strengthening 8. Transfer Training TREATMENT PLAN: Frequency/Duration: twice daily for duration of hospital stay Rehabilitation Potential For Stated Goals: Fair RECOMMENDED REHABILITATION/EQUIPMENT: (at time of discharge pending progress): Due to the probability of continued deficits (see above) this patient will likely need continued skilled physical therapy after discharge. Equipment:  
? None at this time HISTORY:  
History of Present Injury/Illness (Reason for Referral): 
80 y/o female with hx HTN, hypothyroidism, osteoporosis, COPD, continued smoking who presents to ED with left hip and knee pain after having a fall at home 1 hr PTA. She states she was leaning down to place a new bowl of water for her cats and lost balance. Did strike head but no LOC. Not on blood thinners. Left knee very swollen. She states needs a knee replacement but no acute fracture. Does have a left intertrochanteric fracture. No chest pain or cardiac history. Has had cough with bronchitis symptoms for several days. No excess shortness of breath. Vital signs are stable. Will admit for surgical repair of left hip fracture and treat the acute bronchitis. No pneumonia on chest xray. Past Medical History/Comorbidities: Ms. Che Chicas  has no past medical history on file. Ms. Che Chicas  has no past surgical history on file. Social History/Living Environment:  
Home Environment: Private residence # Steps to Enter: 5 One/Two Story Residence: One story Living Alone: Yes Support Systems: Family member(s), Friends \ neighbors Patient Expects to be Discharged to[de-identified] Skilled nursing facility Current DME Used/Available at Home: None Prior Level of Function/Work/Activity: 
Pt reports having SPC she uses mostly outside and sometimes indoors in her R hand. Does not wonder outside home much. Number of Personal Factors/Comorbidities that affect the Plan of Care: 1-2: MODERATE COMPLEXITY EXAMINATION:  
Most Recent Physical Functioning:  
Gross Assessment: 
AROM: Generally decreased, functional 
Strength: Generally decreased, functional 
Coordination: Generally decreased, functional 
Tone: Normal 
Sensation: Intact Posture: Posture (WDL): Exceptions to [facility] Posture Assessment: Trunk flexion Balance: 
Sitting: Impaired Sitting - Static: Good (unsupported) Sitting - Dynamic: Fair (occasional) Standing: Impaired Standing - Static: Good Standing - Dynamic : Fair Bed Mobility: 
  
Wheelchair Mobility: 
  
Transfers: 
Sit to Stand: Moderate assistance Stand to Sit: Minimum assistance Gait: 
Left Side Weight Bearing: Non-weight bearing Base of Support: Center of gravity altered;Narrowed; Shift to right Stance: Weight shift Body Structures Involved: 1. Nerves 2. Bones 3. Joints 4. Muscles 5. Ligaments Body Functions Affected: 1. Mental 
2. Sensory/Pain 3. Neuromusculoskeletal 
4. Movement Related Activities and Participation Affected: 1. General Tasks and Demands 2. Mobility 3. Self Care 4. Domestic Life 5. Community, Social and [place] Number of elements that affect the Plan of Care: 3: MODERATE COMPLEXITY CLINICAL PRESENTATION:  
Presentation: Evolving clinical presentation with changing clinical characteristics: MODERATE COMPLEXITY CLINICAL DECISION [facility] [place] [address] [address] AM-PAC 6 Clicks Basic Mobility Inpatient Short Form How much difficulty does the patient currently have. .. Unable A Lot A Little None 1. Turning over in bed (including adjusting bedclothes, sheets and blankets)? [] 1   [x] 2   [] 3   [] 4  
2. Sitting down on and standing up from a chair with arms ( e.g., wheelchair, bedside commode, etc.)   [] 1   [] 2   [x] 3   [] 4  
3. Moving from lying on back to sitting on the side of the bed? [] 1   [] 2   [x] 3   [] 4 How much help from another person does the patient currently need. .. Total A Lot A Little None 4. Moving to and from a bed to a chair (including a wheelchair)? [] 1   [x] 2   [] 3   [] 4  
5. Need to walk in hospital room? [] 1   [x] 2   [] 3   [] 4  
6. Climbing 3-5 steps with a railing?    [x] 1   [] 2   [] 3   [] 4  
 © 2007, Trustees of [address] [place] [address] [address] [id number], under license to [employer]. All rights reserved Score:  Initial: 13 Most Recent: X (Date: -- ) Interpretation of Tool:  Represents activities that are increasingly more difficult (i.e. Bed mobility, Transfers, Gait). Score 24 23 22-20 19-15 14-10 9-7 6 Modifier CH CI CJ CK CL CM CN   
 
? Mobility - Walking and Moving Around:  
  - CURRENT STATUS: CL - 60%-79% impaired, limited or restricted  - GOAL STATUS: CK - 40%-59% impaired, limited or restricted  - D/C STATUS:  ---------------To be determined--------------- Payor: Lauren Samayoa / Plan: [place] [employer] / Product Type: PrePayMe Care Medicare /   
 
Medical Necessity:    
· Skilled intervention continues to be required due to decreased function. Reason for Services/Other Comments: 
· Patient continues to require skilled intervention due to medical complications and patient unable to attend/participate in therapy as expected. Use of outcome tool(s) and clinical judgement create a POC that gives a: Questionable prediction of patient's progress: MODERATE COMPLEXITY  
  
 
 
 
TREATMENT:  
(In addition to Assessment/Re-Assessment sessions the following treatments were rendered) Pre-treatment Symptoms/Complaints:  none Pain: Initial:  
Pain Intensity 1: 0  Post Session:  0 Assessment/Reassessment only, no treatment provided today Braces/Orthotics/Lines/Etc:  
· IV 
· smith catheter · O2 Device: Nasal cannula Treatment/Session Assessment:   
· Response to Treatment:  See above · Interdisciplinary Collaboration:  
o Physical Therapist 
o Registered Nurse · After treatment position/precautions:  
o Supine in bed 
o Bed alarm/tab alert on 
o Bed/Chair-wheels locked 
o Call light within reach 
o RN notified · Compliance with Program/Exercises: Will assess as treatment progresses · Recommendations/Intent for next treatment session:   \"Next visit will focus on advancements to more challenging activities and reduction in assistance provided\". Total Treatment Duration: PT Patient Time In/Time Out Time In: 1410 Time Out: 1450 LILY MontoyaT

## 2018-11-25 NOTE — ANESTHESIA POSTPROCEDURE EVALUATION
Procedure(s): FEMUR INSERTION INTRA MEDULLARY NAIL. Anesthesia Post Evaluation Multimodal analgesia: multimodal analgesia not used between 6 hours prior to anesthesia start to PACU discharge Patient location during evaluation: bedside Patient participation: complete - patient participated Level of consciousness: awake and alert Pain score: 3 Pain management: adequate Airway patency: patent Anesthetic complications: no 
Cardiovascular status: acceptable and hemodynamically stable Respiratory status: acceptable Hydration status: acceptable Visit Vitals /53 Pulse 77 Temp 36.8 °C (98.2 °F) Resp 14 Ht 5' 6\" (1.676 m) Wt 78.5 kg (173 lb) SpO2 96% BMI 27.92 kg/m²

## 2018-11-25 NOTE — ANESTHESIA PREPROCEDURE EVALUATION
Anesthetic History Review of Systems / Medical History Patient summary reviewed and pertinent labs reviewed Pulmonary COPD: moderate Smoker Neuro/Psych Cardiovascular Hypertension: well controlled Exercise tolerance: >4 METS 
  
GI/Hepatic/Renal 
  
 
 
Renal disease: CRI Endo/Other Hypothyroidism: well controlled Other Findings Physical Exam 
 
Airway Mallampati: II 
TM Distance: 4 - 6 cm Neck ROM: normal range of motion Mouth opening: Normal 
 
 Cardiovascular Regular rate and rhythm,  S1 and S2 normal,  no murmur, click, rub, or gallop Dental 
 
Dentition: Poor dentition Pulmonary Rhonchi:bilateral 
 
 
 
 
 
 Abdominal 
 
 
 
 Other Findings Anesthetic Plan ASA: 3, emergent Anesthesia type: spinal 
 
 
 
 
 
Anesthetic plan and risks discussed with: Patient

## 2018-11-25 NOTE — ANESTHESIA PROCEDURE NOTES
Spinal Block Start time: 11/25/2018 10:25 AM 
End time: 11/25/2018 10:30 AM 
Performed by: Monica Shah MD 
Authorized by: Monica Shah MD  
 
Pre-procedure: Indications: at surgeon's request and primary anesthetic  Preanesthetic Checklist: patient identified, risks and benefits discussed, anesthesia consent, site marked, patient being monitored and timeout performed Timeout Time: 10:24 Spinal Block:  
Patient Position:  Left lateral decubitus Prep Region:  Lumbar Prep: DuraPrep Location:  L2-3 Technique:  Single shot Local Dose (mL):  3 Needle:  
Needle Type:  Quincke Needle Gauge:  22 G Attempts:  1 Events: CSF confirmed, no blood with aspiration and no paresthesia Assessment: 
Insertion:  Uncomplicated Patient tolerance:  Patient tolerated the procedure well with no immediate complications

## 2018-11-25 NOTE — PERIOP NOTES
PTS B/P 98/46, DR PATRICK AT BEDSIDE, GAVE VERBAL ORDER TO HOLD EPHEDRINE AT THIS TIME UNLESS PTS SYSTOLIC B/P DROPS BELOW 90 AGAIN.

## 2018-11-25 NOTE — ED NOTES
TRANSFER - OUT REPORT: 
 
Verbal report given to Devin Mendosa RN on Yoselin Parker  being transferred to [address] [place] [address] for routine progression of care Report consisted of patients Situation, Background, Assessment and  
Recommendations(SBAR). Information from the following report(s) SBAR was reviewed with the receiving nurse. Lines:  
Peripheral IV 11/25/18 Left Antecubital (Active) Site Assessment Clean, dry, & intact 11/25/2018  1:45 AM  
Phlebitis Assessment 0 11/25/2018  1:45 AM  
Infiltration Assessment 0 11/25/2018  1:45 AM  
Dressing Status Clean, dry, & intact 11/25/2018  1:45 AM  
Dressing Type Transparent 11/25/2018  1:45 AM  
Hub Color/Line Status Green;Flushed;Capped 11/25/2018  1:45 AM  
Action Taken Blood drawn 11/25/2018  1:45 AM  
  
 
Opportunity for questions and clarification was provided. Patient transported with: 
 [employer]

## 2018-11-25 NOTE — H&P
Hospitalist H&P/Consult Note Admit Date:  2018  1:47 AM  
Name:  Paige Cespedes Age:  81 y.o. 
:  1935 MRN:  [id number] PCP:  Lupe Barahona MD 
Treatment Team: Attending Provider: Moses Cordero MD; Primary Nurse: Smiley Albrecht RN 
 
HPI:  
Patient is an 80 y/o female with hx HTN, hypothyroidism, osteoporosis, COPD, continued smoking who presents to ED with left hip and knee pain after having a fall at home 1 hr PTA. She states she was leaning down to place a new bowl of water for her cats and lost balance. Did strike head but no LOC. Not on blood thinners. Left knee very swollen. She states needs a knee replacement but no acute fracture. Does have a left intertrochanteric fracture. No chest pain or cardiac history. Has had cough with bronchitis symptoms for several days. No excess shortness of breath. Vital signs are stable. Will admit for surgical repair of left hip fracture and treat the acute bronchitis. No pneumonia on chest xray. 10 systems reviewed and negative except as noted in HPI. No fever or chills No past medical history on file. HTN, COPD, hypothyroid. No cardiac hx No past surgical history on file. ovary benign, s/p chema Prior to Admission Medications Prescriptions Last Dose Informant Patient Reported? Taking?  
aspirin delayed-release 81 mg tablet   Yes No  
Sig: Take  by mouth daily. levothyroxine (SYNTHROID) 125 mcg tablet   No No  
Sig: Take 1 Tab by mouth Daily (before breakfast). lisinopril (PRINIVIL, ZESTRIL) 5 mg tablet   No No  
Sig: Take 1 Tab by mouth daily. Facility-Administered Medications: None No Known Allergies Social History Tobacco Use  Smoking status: Current Every Day Smoker Types: Cigarettes  Smokeless tobacco: Never Used Substance Use Topics  Alcohol use: No  
  
No family history on file. no pertinent fam hx Immunization History Administered Date(s) Administered  Influenza Vaccine 04/10/2015, 11/12/2015  Influenza Vaccine (Quad) Mdck Pf 11/21/2017  Influenza Vaccine (Quad) PF 01/31/2017, 09/11/2018  Pneumococcal Conjugate (PCV-13) 08/08/2017  Pneumococcal Polysaccharide (PPSV-23) 03/10/2015, 07/18/2016 Objective:  
 
Patient Vitals for the past 24 hrs: 
 Temp Pulse Resp BP SpO2  
11/25/18 0337  72 17  90 % 11/25/18 0144 98.3 °F (36.8 °C) 92 18 129/82  Oxygen Therapy O2 Sat (%): 90 % (11/25/18 0337) Pulse via Oximetry: 64 beats per minute (11/25/18 0337) Intake/Output Summary (Last 24 hours) at 11/25/2018 8909 Last data filed at 11/25/2018 2377 Gross per 24 hour Intake 10 ml Output 100 ml Net -90 ml Physical Exam: 
General:    Well nourished. Alert and oriented. Eyes:   Normal sclera. Extraocular movements intact. ENT:  Normocephalic, atraumatic. Moist mucous membranes CV:   Regular rate and rhythm. No murmur, rub, or gallop. Lungs:  Coarse breath sounds with congested cough Abdomen: Soft, nontender, nondistended. Bowel sounds normal.  
Extremities: Warm and dry. No cyanosis or edema. Tender left hip. Neurovascularly intact. Swollen left knee Neurologic: CN II-XII grossly intact. Sensation intact. Skin:     No rashes or jaundice. No wounds. Psych:  Normal mood and affect. I reviewed the labs, imaging, EKGs, telemetry, and other studies done this admission. Data Review:  
Recent Results (from the past 24 hour(s)) CBC W/O DIFF Collection Time: 11/25/18  1:51 AM  
Result Value Ref Range WBC 10.8 4.3 - 11.1 K/uL  
 RBC 4.70 4.05 - 5.2 M/uL  
 HGB 14.6 11.7 - 15.4 g/dL HCT 44.9 35.8 - 46.3 % MCV 95.5 79.6 - 97.8 FL  
 MCH 31.1 26.1 - 32.9 PG  
 MCHC 32.5 31.4 - 35.0 g/dL  
 RDW 12.8 % PLATELET 971 697 - 483 K/uL MPV 11.5 9.4 - 12.3 FL ABSOLUTE NRBC 0.00 0.0 - 0.2 K/uL METABOLIC PANEL, BASIC Collection Time: 11/25/18  1:51 AM  
Result Value Ref Range  Sodium 141 136 - 145 mmol/L  
 Potassium 3.8 3.5 - 5.1 mmol/L Chloride 105 98 - 107 mmol/L  
 CO2 28 21 - 32 mmol/L Anion gap 8 7 - 16 mmol/L Glucose 141 (H) 65 - 100 mg/dL BUN 31 (H) 8 - 23 MG/DL Creatinine 1.22 (H) 0.6 - 1.0 MG/DL  
 GFR est AA 54 (L) >60 ml/min/1.73m2 GFR est non-AA 45 (L) >60 ml/min/1.73m2 Calcium 8.3 8.3 - 10.4 MG/DL  
PTT Collection Time: 11/25/18  1:51 AM  
Result Value Ref Range aPTT <20.0 (L) 23.2 - 35.3 SEC PROTHROMBIN TIME + INR Collection Time: 11/25/18  1:51 AM  
Result Value Ref Range Prothrombin time 12.1 11.5 - 14.5 sec INR 0.9 PREALBUMIN Collection Time: 11/25/18  3:15 AM  
Result Value Ref Range Prealbumin 16.3 (L) 18.0 - 35.7 MG/DL  
PTH INTACT Collection Time: 11/25/18  3:15 AM  
Result Value Ref Range Calcium 8.2 (L) 8.3 - 10.4 MG/DL  
 PTH, Intact PENDING pg/mL URINALYSIS W/ RFLX MICROSCOPIC Collection Time: 11/25/18  3:19 AM  
Result Value Ref Range Color JEFRY Appearance TURBID Specific gravity 1.026 (H) 1.001 - 1.023    
 pH (UA) 5.5 5.0 - 9.0 Protein 30 (A) NEG mg/dL Glucose NEGATIVE  mg/dL Ketone TRACE (A) NEG mg/dL Bilirubin SMALL (A) NEG Blood LARGE (A) NEG Urobilinogen 1.0 0.2 - 1.0 EU/dL Nitrites NEGATIVE  NEG Leukocyte Esterase NEGATIVE  NEG    
 WBC 5-10 0 /hpf  
 RBC 10-20 0 /hpf Epithelial cells 0-3 0 /hpf Bacteria 0 0 /hpf Casts 10-20 0 /lpf Imaging Studies: CXR Results  (Last 48 hours)  
          
 11/25/18 0241  XR CHEST SNGL V Final result Impression:  IMPRESSION: Minimal linear atelectasis of the left lower lobe. Narrative:  EXAM:  XR CHEST SNGL V  
   
INDICATION:  pre op COMPARISON:  None. FINDINGS: A portable AP radiograph of the chest was obtained at 0219 hours. The  
patient is on a cardiac monitor. Calcified granuloma in the right lower lobe. Minimal linear atelectasis of the left lower lobe.   The cardiac and mediastinal  
 contours and pulmonary vascularity are normal.  The bones and soft tissues are  
grossly within normal limits. CT Results  (Last 48 hours) None Assessment and Plan:  
 
Hospital Problems as of 11/25/2018 Date Reviewed: 9/11/2018 Codes Class Noted - Resolved POA * (Principal) Closed left hip fracture ([address] ICD-10-CM: V39.162X ICD-9-CM: 820.8  11/25/2018 - Present Yes Acute bronchitis ICD-10-CM: J20.9 ICD-9-CM: 466.0  11/25/2018 - Present Yes Dehydration ICD-10-CM: E86.0 ICD-9-CM: 276.51  11/25/2018 - Present Yes COPD (chronic obstructive pulmonary disease) (HCC) ICD-10-CM: J44.9 ICD-9-CM: 468  3/10/2015 - Present Yes Osteoporosis ICD-10-CM: M81.0 ICD-9-CM: 733.00  5/16/2016 - Present Yes Hypothyroidism ICD-10-CM: E03.9 ICD-9-CM: 244.9  3/10/2015 - Present Yes HTN (hypertension) ICD-10-CM: I10 
ICD-9-CM: 401.9  3/10/2015 - Present Yes Smoker ICD-10-CM: O49.744 ICD-9-CM: 305.1  3/10/2015 - Present Yes PLAN: 
· Admit to ortho unit · Hip fracture order set utilized · Pain control · Bedrest 
· Hold antiplatelets/AC 
· Ortho consulted · Continue other home meds as ordered · Patient has no hx ischemic heart disease, diabetes, CHF. No hx problems with anesthesia. Does have COPD and some mild acute bronchitis. Will treat bronchitis with duonebs, rocephin, steroids and robitussin DM. Give IV fluids for dehydration. No other interventions required prior to surgery. FEN:  NPO prior to surgery DVT ppx:  SCDs until after surgery Estimated LOS:  3 days Anticipated DC needs:  Ppd ordered. CM consulted. PT/OT evals after surgery Risk:  high Signed: 
Mira Hinson MD

## 2018-11-25 NOTE — PROGRESS NOTES
Hospitalist Progress Note Subjective:  
Daily Progress Note: 11/25/2018 5:14 PM 
 
Patient presented to ER early this am after she tripped while getting water for her animals, falling to floor and landing on her side with hip and knee pain. Left knee with immediate pain, deformity, notable edema  and inability to bear weight. Xray indicated no knee fracture, however she was found with left comminuted, displaced intertrochanteric fracture of the hip. Right knee without acute findings. Also noted with notable COPD with congested breath sounds and cough, bronchitis. Begun on rocephin. Long time smoker. Underwent insertion of intramedullary nail left femur this am per Dr Dr Simon Bates, did well intraoperatively and in the immediate post op period with stable vitals, pain controlled and no nausea. She was found by nurses smoking in bed. Contrary. To MRI for knee late in the day. ADDITIONAL HISTORY:  Long standing, ongoing tobacco abuse, Hypertension, COPD, Hypothyroidism, cholecystectomy, CRI Current Facility-Administered Medications Medication Dose Route Frequency  tuberculin injection 5 Units  5 Units IntraDERMal ONCE  
 levothyroxine (SYNTHROID) tablet 125 mcg  125 mcg Oral ACB  lisinopril (PRINIVIL, ZESTRIL) tablet 5 mg  5 mg Oral DAILY  albuterol-ipratropium (DUO-NEB) 2.5 MG-0.5 MG/3 ML  3 mL Nebulization Q4H PRN  
 oxyCODONE IR (ROXICODONE) tablet 5 mg  5 mg Oral Q4H PRN  
 traMADol (ULTRAM) tablet 50 mg  50 mg Oral Q6H PRN  
 cefTRIAXone (ROCEPHIN) 1 g in 0.9% sodium chloride (MBP/ADV) 50 mL  1 g IntraVENous Q24H  
 guaiFENesin-dextromethorphan (ROBITUSSIN DM) 100-10 mg/5 mL syrup 10 mL  10 mL Oral Q6H PRN  
 [START ON 11/26/2018] predniSONE (DELTASONE) tablet 20 mg  20 mg Oral DAILY WITH BREAKFAST  lactated Ringers infusion  75 mL/hr IntraVENous ON CALL TO OR  
 lactated Ringers infusion  75 mL/hr IntraVENous CONTINUOUS  
  sodium chloride (NS) flush 5-10 mL  5-10 mL IntraVENous Q8H  
 sodium chloride (NS) flush 5-10 mL  5-10 mL IntraVENous PRN  
 ceFAZolin (ANCEF) 1,000 mg in 0.9% sodium chloride (MBP/ADV) 50 mL ADV  1 g IntraVENous Q8H  
 acetaminophen (TYLENOL) tablet 650 mg  650 mg Oral Q8H  
 ondansetron (ZOFRAN) injection 4 mg  4 mg IntraVENous Q4H PRN  
 docusate sodium (COLACE) capsule 100 mg  100 mg Oral BID  alum-mag hydroxide-simeth (MYLANTA) oral suspension 30 mL  30 mL Oral Q4H PRN  
 calcium-vitamin D (OS-YEISON) 500 mg-200 unit tablet  1 Tab Oral TID WITH MEALS  
 [START ON 2018] enoxaparin (LOVENOX) injection 30 mg  30 mg SubCUTAneous Q24H Review of Systems A comprehensive review of systems was negative except for that written in the HPI. Objective:  
 
Visit Vitals BP 99/64 (BP 1 Location: Left arm, BP Patient Position: At rest) Pulse (!) 102 Temp 98.4 °F (36.9 °C) Resp 18 Ht 5' 6\" (1.676 m) Wt 78.5 kg (173 lb) SpO2 90% BMI 27.92 kg/m² O2 Flow Rate (L/min): 3 l/min O2 Device: Nasal cannula Temp (24hrs), Av.3 °F (36.8 °C), Min:97.1 °F (36.2 °C), Max:99.6 °F (37.6 °C) 
 
 07 -  1900 In: 1440 [P.O.:240; I.V.:1200] Out: 350 [Urine:300] 1901 -  0700 In: 10 Out: 100 [Urine:100] General appearance: Oriented and alert, cooperative, eating and drinking. Pain is controlled. Head: Normocephalic, without obvious abnormality, atraumatic Eyes: conjunctivae/corneas clear. PERRL Throat: normal findings: lips normal without lesions Neck: supple, symmetrical, trachea midline, and no JVD Lungs: clear to auscultation bilaterally Heart: regular rate and rhythm, S1, S2 normal, no murmur, click, rub or gallop Abdomen: soft, non-tender. Bowel sounds normal. No masses,  no organomegaly Extremities: extremities normal, atraumatic, no cyanosis or edema Skin: Skin color, texture, turgor normal. No rashes or lesions Neurologic: Grossly normal 
 
 Additional comments: Notes,orders, test results, vitals reviewed Data Review Recent Results (from the past 24 hour(s)) CBC W/O DIFF Collection Time: 11/25/18  1:51 AM  
Result Value Ref Range WBC 10.8 4.3 - 11.1 K/uL  
 RBC 4.70 4.05 - 5.2 M/uL  
 HGB 14.6 11.7 - 15.4 g/dL HCT 44.9 35.8 - 46.3 % MCV 95.5 79.6 - 97.8 FL  
 MCH 31.1 26.1 - 32.9 PG  
 MCHC 32.5 31.4 - 35.0 g/dL  
 RDW 12.8 % PLATELET 668 382 - 649 K/uL MPV 11.5 9.4 - 12.3 FL ABSOLUTE NRBC 0.00 0.0 - 0.2 K/uL METABOLIC PANEL, BASIC Collection Time: 11/25/18  1:51 AM  
Result Value Ref Range Sodium 141 136 - 145 mmol/L Potassium 3.8 3.5 - 5.1 mmol/L Chloride 105 98 - 107 mmol/L  
 CO2 28 21 - 32 mmol/L Anion gap 8 7 - 16 mmol/L Glucose 141 (H) 65 - 100 mg/dL BUN 31 (H) 8 - 23 MG/DL Creatinine 1.22 (H) 0.6 - 1.0 MG/DL  
 GFR est AA 54 (L) >60 ml/min/1.73m2 GFR est non-AA 45 (L) >60 ml/min/1.73m2 Calcium 8.3 8.3 - 10.4 MG/DL  
PTT Collection Time: 11/25/18  1:51 AM  
Result Value Ref Range aPTT <20.0 (L) 23.2 - 35.3 SEC PROTHROMBIN TIME + INR Collection Time: 11/25/18  1:51 AM  
Result Value Ref Range Prothrombin time 12.1 11.5 - 14.5 sec INR 0.9 EKG, 12 LEAD, INITIAL Collection Time: 11/25/18  2:03 AM  
Result Value Ref Range Ventricular Rate 97 BPM  
 Atrial Rate 86 BPM  
 P-R Interval 152 ms QRS Duration 80 ms  
 Q-T Interval 328 ms QTC Calculation (Bezet) 416 ms Calculated P Axis 76 degrees Calculated R Axis 36 degrees Calculated T Axis 60 degrees Diagnosis    
  !! AGE AND GENDER SPECIFIC ECG ANALYSIS !! Sinus rhythm with Premature atrial complexes Inferior infarct , age undetermined Cannot rule out Anterior infarct , age undetermined Abnormal ECG No previous ECGs available Confirmed by Thi Harris ([id number]) on 11/25/2018 8:20:34 AM 
  
TYPE & SCREEN Collection Time: 11/25/18  2:57 AM  
Result Value Ref Range Crossmatch Expiration 11/28/2018 ABO/Rh(D) A NEGATIVE Antibody screen NEG PREALBUMIN Collection Time: 11/25/18  3:15 AM  
Result Value Ref Range Prealbumin 16.3 (L) 18.0 - 35.7 MG/DL  
PTH INTACT Collection Time: 11/25/18  3:15 AM  
Result Value Ref Range Calcium 8.2 (L) 8.3 - 10.4 MG/DL  
 PTH, Intact 219.5 (H) 18.5 - 88.0 pg/mL URINALYSIS W/ RFLX MICROSCOPIC Collection Time: 11/25/18  3:19 AM  
Result Value Ref Range Color JEFRY Appearance TURBID Specific gravity 1.026 (H) 1.001 - 1.023    
 pH (UA) 5.5 5.0 - 9.0 Protein 30 (A) NEG mg/dL Glucose NEGATIVE  mg/dL Ketone TRACE (A) NEG mg/dL Bilirubin SMALL (A) NEG Blood LARGE (A) NEG Urobilinogen 1.0 0.2 - 1.0 EU/dL Nitrites NEGATIVE  NEG Leukocyte Esterase NEGATIVE  NEG    
 WBC 5-10 0 /hpf  
 RBC 10-20 0 /hpf Epithelial cells 0-3 0 /hpf Bacteria 0 0 /hpf Casts 10-20 0 /lpf MSSA/MRSA SC BY PCR, NASAL SWAB Collection Time: 11/25/18  4:42 AM  
Result Value Ref Range Special Requests: NO SPECIAL REQUESTS Culture result: (A) MRSA target DNA not detected, SA target DNA detected. A MRSA negative, SA positive test result does not preclude MRSA nasal colonization. LEFT FEMUR:  2  views of the left femur demonstrate a comminuted, displaced 
intertrochanteric fracture of the proximal femur. The mid and distal femur are intact. The knee is anatomically aligned. The soft tissues are normal.. IMPRESSION: Intertrochanteric fracture. LEFT TIBIA AND FIBULA: Tibia and fibula are intact. Ankles anatomically aligned. Soft tissues are normal. Plantar calcaneal spur. IMPRESSION:  No fractures are identified. LEFT HIP:  An AP view of the pelvis and a frogleg lateral view of the left hip demonstrate a comminuted, displaced intertrochanteric fracture. Remaining pelvic 
bones are intact. Chon Ortiz IMPRESSION: Comminuted, displaced intertrochanteric fracture. 
  
 CXR:  A portable AP radiograph of the chest was obtained at 0219 hours. The patient is on a cardiac monitor. Calcified granuloma in the right lower lobe. Minimal linear atelectasis of the left lower lobe. The cardiac and mediastinal contours and pulmonary vascularity are normal.  The bones and soft tissues are grossly within normal limits. IMPRESSION: Minimal linear atelectasis of the left lower lobe. 
  
LEFT KNEE: Three views of the left knee demonstrate a chronic, benign deformity of the proximal tibia. Degenerative changes of the DIP. No evidence of a joint 
effusion. [facility] IMPRESSION: No acute abnormalities. Assessment/Plan:  
Mechanical fall Comminuted, displaced intertrochanteric fracture left hip. S/P insertion intra medullary left femur per Dr Elizabeth Gregg 11/25 Routine fracture post op orders Acute left knee sprain sustained in fall MRI Pending Hypothyroidism Home meds HTN 
  home meds COPD Nebs prn Long standing tobacco abuse May need patch Osteoporosis Dehydration Continue IVF Acute bronchitis Nebs and mucinex, tessalon prn, monitor Acute UTI Begin rocephin  
 Urine culture pending Care Plan discussed with: Patient and Nurse Signed By: Lindsey Garza NP November 25, 2018

## 2018-11-25 NOTE — PROGRESS NOTES
Patient caught smoking in her room. Patient's cigarettes and medication bottles taken and placed in locked pt medication cart

## 2018-11-25 NOTE — PERIOP NOTES
TRANSFER - OUT REPORT: 
 
Verbal report given to CARMEN(name) on Viola Basurto  being transferred to [facility](unit) for routine post - op Report consisted of patients Situation, Background, Assessment and  
Recommendations(SBAR). Information from the following report(s) SBAR, Kardex, OR Summary, Procedure Summary, Intake/Output and MAR was reviewed with the receiving nurse. Lines:  
Peripheral IV 11/25/18 Posterior;Right Hand (Active) Site Assessment Clean, dry, & intact 11/25/2018 11:46 AM  
Phlebitis Assessment 0 11/25/2018 11:46 AM  
Infiltration Assessment 0 11/25/2018 11:46 AM  
Dressing Status Clean, dry, & intact 11/25/2018 11:46 AM  
Dressing Type Transparent;Tape 11/25/2018 11:46 AM  
Hub Color/Line Status Infusing;Pink 11/25/2018 11:46 AM  
Alcohol Cap Used No 11/25/2018 11:46 AM  
  
 
Opportunity for questions and clarification was provided. Patient transported with: 
 O2 @ 3 liters VTE prophylaxis orders have been written for Viola Basurto. Patient and family given floor number and nurses name. Family updated re: pt status after security code verified.

## 2018-11-25 NOTE — CONSULTS
ORTHO; 
 
PATIENT SCHEDULED FOR SURGERY Sunday AT 9:30 AM WITH DR Marycarmen Garza 
 
ORDERS IN CHART

## 2018-11-26 NOTE — PROGRESS NOTES
Physical Therapy Note: 
 
New orders received for [facility]AT LLE after x-rays reviewed. Assisted physical therapist assistant with transfer this PM to determine if goals need to be updated. Patient exhibits improved ambulation and transfers with change in weight bearing status, requiring minimal to moderate assistance to ambulate 7' with rolling walker. Updated goals are as follows: STG Goals Updated 11/26/18: 
(1.)Ms. Lorrin Claude will move from supine to sit and sit to supine , scoot up and down and roll side to side with STAND BY ASSIST within 3 treatment day(s). (2.)Ms. Lorrin Claude will transfer from bed to chair and chair to bed with CONTACT GUARD ASSIST using the least restrictive device within 3 treatment day(s). (3.)Ms. Lorrin Claude will ambulate with MINIMAL ASSIST for 40 feet with the least restrictive device within 3 treatment day(s). (4.)Ms. Lorrin Claude will perform standing static and dynamic balance activities x 15 minutes with MINIMAL ASSIST to improve safety within 3 day(s). (5.)Ms. Lorrin Claude will perform LE exercises with 1 to 2 cues for form within 3 days to improve strength for functional transfers and ambulation. LTG: 
(1.)Ms. Lorrin Claude will move from supine to sit and sit to supine , scoot up and down and roll side to side in bed with SUPERVISION within 7 treatment day(s). (2.)Ms. Lorrin Claude will transfer from bed to chair and chair to bed with STAND BY ASSIST using the least restrictive device within 7 treatment day(s). (3.)Ms. Lorrin Claude will ambulate with CONTACT GUARD ASSIST for 75 feet with the least restrictive device within 7 treatment day(s). (4.)Ms. Lorrin Claude will perform standing static and dynamic balance activities x 15 minutes with CONTACT GUARD ASSIST to improve safety within 7 day(s). ________________________________________________________________________________________________

## 2018-11-26 NOTE — PROGRESS NOTES
Pt made aware pending auth for admission to [facility], clinicals faxed. Pending response.

## 2018-11-26 NOTE — PROGRESS NOTES
Problem: Falls - Risk of 
Goal: *Absence of Falls Document Debria Check Fall Risk and appropriate interventions in the flowsheet. Outcome: Progressing Towards Goal 
Fall Risk Interventions: 
Mobility Interventions: Bed/chair exit alarm, OT consult for ADLs, Patient to call before getting OOB, PT Consult for mobility concerns Medication Interventions: Bed/chair exit alarm, Evaluate medications/consider consulting pharmacy, Patient to call before getting OOB Elimination Interventions: Call light in reach, Bed/chair exit alarm, Patient to call for help with toileting needs History of Falls Interventions: Bed/chair exit alarm, Door open when patient unattended, Investigate reason for fall Problem: Pressure Injury - Risk of 
Goal: *Prevention of pressure injury Document Shivam Scale and appropriate interventions in the flowsheet. Outcome: Progressing Towards Goal 
Pressure Injury Interventions: 
Sensory Interventions: Assess changes in LOC Moisture Interventions: Absorbent underpads Activity Interventions: Increase time out of bed, Pressure redistribution bed/mattress(bed type), PT/OT evaluation Mobility Interventions: HOB 30 degrees or less, Pressure redistribution bed/mattress (bed type), PT/OT evaluation Nutrition Interventions: Document food/fluid/supplement intake, Offer support with meals,snacks and hydration Friction and Shear Interventions: HOB 30 degrees or less

## 2018-11-26 NOTE — BRIEF OP NOTE
BRIEF OPERATIVE NOTE Date of Procedure: 11/25/2018 Preoperative Diagnosis: left femur fracture Postoperative Diagnosis: Left intertrochanteric femur fracture Procedure(s): LEFT FEMUR INSERTION INTRA MEDULLARY NAIL Surgeon(s) and Role: 
   * Sarah Escobar MD - Primary Surgical Assistant: NONE Surgical Staff: 
Circ-1: Natalie Jameson RN Scrub Tech-1: Dick Car Scrub Tech-2: Alvarez Benavides Event Time In Time Out Incision Start 1046 Incision Close 1110 Anesthesia: General  
Estimated Blood Loss: 100 CC Specimens: * No specimens in log * Findings: NONE Complications: NONE Implants:  
Implant Name Type Inv. Item Serial No.  Lot No. LRB No. Used Action BLADE HELCL FEN 95MM STRL -- TFNA - [id number]  BLADE HELCL FEN 95MM STRL -- [employer]  [employer] [place] [id number] Left 1 Implanted NAIL HANK 130D [id number] LT -- TFNA STRL - [id number]  NAIL HANK 130D [id number] LT -- TDI [employer] [place] [id number] Left 1 Implanted

## 2018-11-26 NOTE — OP NOTES
[address] 
OPERATIVE REPORT Radha Holly 
MR#: [id number] : 1935 ACCOUNT #: [de-identified] DATE OF SERVICE: 2018 PREOPERATIVE DIAGNOSES: 
1. Left intertrochanteric hip fracture. 2.  Sprain, left knee. Rule out fracture. POSTOPERATIVE DIAGNOSES: 
1. Left intertrochanteric hip fracture. 2.  Sprain, left knee. Rule out fracture. PROCEDURE PERFORMED:  Open reduction and internal fixation, left intertrochanteric hip fracture, with intramedullary nail. SURGEON:  Gee Hall. Matteo Loya MD 
 
ASSISTANT:  NONE 
 
ANESTHESIA:  Spinal. 
 
ESTIMATED BLOOD LOSS:  TOURNIQUET 
 
SPECIMENS REMOVED:  NONE 
 
COMPLICATIONS:  NONE 
 
IMPLANTS:  SEE BRIEF OP NOTE PROCEDURE:  The patient was brought to the operative suite, placed in supine position. After adequate anesthesia was achieved in the form of spinal, patient was placed onto the fracture table. Perineal posts and foot holders were well padded. Left hip underwent closed reduction with longitudinal traction, internal rotation and adduction. Her left knee had significant ecchymosis and deformity from a previous fracture. Fluoroscopic images of the left knee while on the fracture table showed the malunion of the proximal tibia. I did not see any new fracture. There was no gross instability. The left hip was then prepped and draped in the usual sterile fashion. We chose the Synthes trochanteric fixation nail Advanced. The 3 cm incision was made over the tip of the left greater trochanter. Hemostasis was achieved with Bovie cautery. A guidewire for the Synthes trochanteric fixation nail Advanced was inserted through the tip of the trochanter across the fracture site into the canal of the femur as confirmed by fluoroscopy.   Proximal reamer was then passed by hand over this guidewire to open up a proximal portal.  These were removed and a ball tip guidewire was inserted through this portal across the fracture site down the epiphyseal scar of the knee. Depth gauge was used to determine the appropriate length nail. The decision was made to leave the nail a little bit short distally in case work needed to be done on the femur distally. The 11 mm reamer was passed by hand over the guidewire to determine the diameter of the nail. The nail chosen was a Synthes trochanteric Advanced nail 400 mm x 130 degree x 12 mm left. This was inserted over the ball-tip guidewire without difficulty. The proximal targeting guide was inserted through a stab wound in the lateral aspect of the proximal femur. Guidewire was inserted through the lateral cortex neck and into the head, stopping just short of subchondral bone. It was center-center on the AP and lateral fluoroscopic images. The depth gauge was used to determine the appropriate length helical blade. Reamers were passed over the guidewire to open up the lateral cortex and neck. The 95 mm helical blade, fenestrated, was passed over the guidewire with excellent purchase in the head, again stopping just short of subchondral bone. The setscrew was tightened with the torque wrench to a dynamic position and then the compressing device was used to compress the fracture to a more stable position. Targeting guides were removed. AP and lateral fluoroscopic images confirmed the fracture was reduced anatomically and the hardware was in good position. The wounds were irrigated with normal saline. They were closed in layers. Sterile compressive dressings were applied. Patient was placed into a knee immobilizer. She will be nonweightbearing until an MRI can be obtained to evaluate the knee to make sure we are not dealing with an occult fracture. MRI will also look , obviously, for ligamentous injury. MD Apryl Underwood / Consuelo Lewis 
D: 11/25/2018 11:06    
T: 11/25/2018 20:47 
 JOB #: [id number]

## 2018-11-26 NOTE — PROGRESS NOTES
Problem: Mobility Impaired (Adult and Pediatric) Goal: *Acute Goals and Plan of Care (Insert Text) STG Goals: updated 11/26/18 due to upgraded weight bearing status (1.)Ms. Abdullahi Phelps will move from supine to sit and sit to supine , scoot up and down and roll side to side with STAND BY ASSIST within 3 treatment day(s). (2.)Ms. Abdullahi Phelps will transfer from bed to chair and chair to bed with CONTACT GUARD ASSIST using the least restrictive device within 3 treatment day(s). (3.)Ms. Abdullahi Phelps will ambulate with MINIMAL ASSIST for 40 feet with the least restrictive device within 3 treatment day(s). (4.)Ms. Abdullahi Phelps will perform standing static and dynamic balance activities x 15 minutes with MINIMAL ASSIST to improve safety within 3 day(s). (5.)Ms. Abdullahi Phelps will perform LE exercises with 1 to 2 cues for form within 3 days to improve strength for functional transfers and ambulation. 
  
LTG: 
(1.)Ms. Abdullahi Phelps will move from supine to sit and sit to supine , scoot up and down and roll side to side in bed with SUPERVISION within 7 treatment day(s). (2.)Ms. Abdullahi Phelps will transfer from bed to chair and chair to bed with STAND BY ASSIST using the least restrictive device within 7 treatment day(s). (3.)Ms. Abdullahi Phelps will ambulate with CONTACT GUARD ASSIST for 75 feet with the least restrictive device within 7 treatment day(s). (4.)Ms. Abdullahi Phelps will perform standing static and dynamic balance activities x 15 minutes with CONTACT GUARD ASSIST to improve safety within 7 day(s). ________________________________________________________________________________________________ PHYSICAL THERAPY: Daily Note, Treatment Day: 1st, PM 11/26/2018 INPATIENT: Hospital Day: 2 Payor: Joon Brandon / Plan: [place] Travel Notes / Product Type: Managed Care Medicare /   
WBAT LLE Keep KI at all times NAME/AGE/GENDER: Jiljesenia Websterlindseybirdie is a 81 y.o. female PRIMARY DIAGNOSIS: Closed left hip fracture (HCC) Closed left hip fracture (HCC) Closed left hip fracture ([address] Procedure(s) (LRB): FEMUR INSERTION INTRA MEDULLARY NAIL (Left) 1 Day Post-Op ICD-10: Treatment Diagnosis:  
 · Generalized Muscle Weakness (M62.81) · Difficulty in walking, Not elsewhere classified (R26.2) Precaution/Allergies: 
Patient has no known allergies. ASSESSMENT:  
 
Ms. Gerardo Epstein was sitting up in recliner chair. New weight bearing orders appreciated. Robert Patient, PT present during treatment to update physical therapy goals. Patient able to transfer to standing with min-mod assist and cues for proper technique/hand placement. Once standing patient able to perform gait training x 8' then 6' (to and from [facility]) with use of rolling walker, min-mod assist, and below cues in gait section. Patient returns to recliner chair where she participates in therapeutic strengthening exercises to improve functional strength for transfers, gait and overall mobility. Patient requires cues and assistance to perform exercises correctly. Overall good progress towards physical therapy goals. No goals have been met thus far. Will continue efforts. This section established at most recent assessment PROBLEM LIST (Impairments causing functional limitations): 1. Decreased Strength 2. Decreased ADL/Functional Activities 3. Decreased Transfer Abilities 4. Decreased Ambulation Ability/Technique 5. Decreased Balance 6. Decreased Activity Tolerance 7. Decreased Pacing Skills 8. Increased Fatigue 9. Increased Shortness of Breath 10. Decreased Flexibility/Joint Mobility 11. Decreased Knowledge of Precautions INTERVENTIONS PLANNED: (Benefits and precautions of physical therapy have been discussed with the patient.) 1. Balance Exercise 2. Bed Mobility 3. Gait Training 4. Neuromuscular Re-education/Strengthening 5. Range of Motion (ROM) 6. Therapeutic Activites 7. Therapeutic Exercise/Strengthening 8. Transfer Training TREATMENT PLAN: Frequency/Duration: twice daily for duration of hospital stay Rehabilitation Potential For Stated Goals: Fair RECOMMENDED REHABILITATION/EQUIPMENT: (at time of discharge pending progress): Due to the probability of continued deficits (see above) this patient will likely need continued skilled physical therapy after discharge. Equipment:  
? None at this time HISTORY:  
History of Present Injury/Illness (Reason for Referral): 
80 y/o female with hx HTN, hypothyroidism, osteoporosis, COPD, continued smoking who presents to ED with left hip and knee pain after having a fall at home 1 hr PTA. She states she was leaning down to place a new bowl of water for her cats and lost balance. Did strike head but no LOC. Not on blood thinners. Left knee very swollen. She states needs a knee replacement but no acute fracture. Does have a left intertrochanteric fracture. No chest pain or cardiac history. Has had cough with bronchitis symptoms for several days. No excess shortness of breath. Vital signs are stable. Will admit for surgical repair of left hip fracture and treat the acute bronchitis. No pneumonia on chest xray. Past Medical History/Comorbidities: Ms. Tisha Quiñonez  has no past medical history on file. Ms. Tisha Quiñonez  has a past surgical history that includes FEMUR INSERTION INTRA MEDULLARY NAIL (Left, 11/25/2018). Social History/Living Environment:  
Home Environment: Private residence # Steps to Enter: 5 One/Two Story Residence: One story Living Alone: Yes Support Systems: Family member(s), Friends \ neighbors Patient Expects to be Discharged to[de-identified] Skilled nursing facility Current DME Used/Available at Home: None Prior Level of Function/Work/Activity: 
Pt reports having SPC she uses mostly outside and sometimes indoors in her R hand. Does not wonder outside home much. Number of Personal Factors/Comorbidities that affect the Plan of Care: 1-2: MODERATE COMPLEXITY EXAMINATION:  
Most Recent Physical Functioning:  
Gross Assessment: 
  
         
  
Posture: 
  
Balance: 
Sitting: Impaired Sitting - Static: Good (unsupported) Sitting - Dynamic: Fair (occasional) Standing: Impaired Standing - Static: Fair Standing - Dynamic : Fair(poor once fatigued) Bed Mobility: 
Supine to Sit: Maximum assistance; Additional time Wheelchair Mobility: 
  
Transfers: 
Sit to Stand: Moderate assistance;Minimum assistance Stand to Sit: Minimum assistance; Moderate assistance Gait: 
Left Side Weight Bearing: As tolerated Base of Support: Widened Speed/Nancy: Shuffled; Slow Step Length: Left shortened;Right shortened Gait Abnormalities: Decreased step clearance;Shuffling gait;Trunk sway increased; Step to gait Distance (ft): (8' then 6') Assistive Device: Walker, rolling Ambulation - Level of Assistance: Minimal assistance; Moderate assistance Interventions: Safety awareness training; Tactile cues; Verbal cues;Manual cues Body Structures Involved: 1. Nerves 2. Bones 3. Joints 4. Muscles 5. Ligaments Body Functions Affected: 1. Mental 
2. Sensory/Pain 3. Neuromusculoskeletal 
4. Movement Related Activities and Participation Affected: 1. General Tasks and Demands 2. Mobility 3. Self Care 4. Domestic Life 5. Community, Social and [place] Number of elements that affect the Plan of Care: 3: MODERATE COMPLEXITY CLINICAL PRESENTATION:  
Presentation: Evolving clinical presentation with changing clinical characteristics: MODERATE COMPLEXITY CLINICAL DECISION MAKIN [facility] [address] [address] AM-PAC 6 Clicks Basic Mobility Inpatient Short Form How much difficulty does the patient currently have. .. Unable A Lot A Little None 1. Turning over in bed (including adjusting bedclothes, sheets and blankets)?    [] 1   [x] 2   [] 3   [] 4  
 2.  Sitting down on and standing up from a chair with arms ( e.g., wheelchair, bedside commode, etc.)   [] 1   [] 2   [x] 3   [] 4  
3. Moving from lying on back to sitting on the side of the bed? [] 1   [] 2   [x] 3   [] 4 How much help from another person does the patient currently need. .. Total A Lot A Little None 4. Moving to and from a bed to a chair (including a wheelchair)? [] 1   [x] 2   [] 3   [] 4  
5. Need to walk in hospital room? [] 1   [x] 2   [] 3   [] 4  
6. Climbing 3-5 steps with a railing? [x] 1   [] 2   [] 3   [] 4  
© 2007, Trustees of [facility] [employer], under license to [employer]. All rights reserved Score:  Initial: 13 Most Recent: X (Date: -- ) Interpretation of Tool:  Represents activities that are increasingly more difficult (i.e. Bed mobility, Transfers, Gait). Score 24 23 22-20 19-15 14-10 9-7 6 Modifier CH CI CJ CK CL CM CN   
 
? Mobility - Walking and Moving Around:  
  - CURRENT STATUS: CL - 60%-79% impaired, limited or restricted  - GOAL STATUS: CK - 40%-59% impaired, limited or restricted  - D/C STATUS:  ---------------To be determined--------------- Payor: Raina Montes De Oca / Plan: [employer] / Product Type: Managed Care Medicare /   
 
Medical Necessity:    
· Skilled intervention continues to be required due to decreased function. Reason for Services/Other Comments: 
· Patient continues to require skilled intervention due to medical complications and patient unable to attend/participate in therapy as expected. Use of outcome tool(s) and clinical judgement create a POC that gives a: Questionable prediction of patient's progress: MODERATE COMPLEXITY  
  
 
 
 
TREATMENT:  
(In addition to Assessment/Re-Assessment sessions the following treatments were rendered) Pre-treatment Symptoms/Complaints:  none Pain: Initial:  
Pain Intensity 1: 0  Post Session:  0  
 Gait Training (  15 Minutes):  Gait training to improve and/or restore physical functioning as related to mobility, strength and balance. Ambulated (8' then 6') with Minimal assistance; Moderate assistance using a Walker, rolling and moderate Safety awareness training; Tactile cues; Verbal cues;Manual cues related to their sequencing, walker manipulation, weight shifts, UE support on rolling walker, DEVAUGHN, and foot placement to promote proper body alignment, promote proper body posture and promote proper body mechanics. Instruction in performance of the above deficits to correct overall gait quality and functional mobility. Therapeutic Activity: (     Minutes): Therapeutic activities including bed mobility training, transfer training, SPT training, static/dynamic sitting/standing balance training, posture training, instruction in sequencing with rolling walker, and patient education to improve mobility, strength and balance. Required moderate verbal, tactile and manual cues Safety awareness training; Tactile cues; Verbal cues;Manual cues to promote static and dynamic balance in standing and promote coordination of bilateral, lower extremity(s). Therapeutic Exercise: (  10 Minutes):  Exercises per grid below to improve mobility, strength and balance. Required moderate visual, verbal, manual and tactile cues to promote proper body alignment, promote proper body posture and promote proper body mechanics. Progressed range, repetitions and complexity of movement as indicated. Date: 
11/26/18 Date: 
 Date: 
  
ACTIVITY/EXERCISE AM PM AM PM AM PM  
Ambulation:           Distance Device Duration Seated Heel Raises 2x10B A 2x10B A Seated Toe Raises 2x10B A 2x10B A Seated quad sets x10B A x10B A Seated glut sets x10B A x10B A Seated Hip Abduction x10B  AA x10B AA      
        
B = bilateral; AA = active assistive; A = active; P = passive Braces/Orthotics/Lines/Etc:  
 · O2 Device: Nasal cannula Treatment/Session Assessment:   
· Response to Treatment:  See above · Interdisciplinary Collaboration:  
o Physical Therapist 
o Physical Therapy Assistant 
o Registered Nurse · After treatment position/precautions:  
o Up in chair 
o Bed alarm/tab alert on 
o Bed/Chair-wheels locked 
o Call light within reach 
o RN notified · Compliance with Program/Exercises: Will assess as treatment progresses · Recommendations/Intent for next treatment session: \"Next visit will focus on advancements to more challenging activities and reduction in assistance provided\". Total Treatment Duration: PT Patient Time In/Time Out Time In: 0775 Time Out: 1315 Britta Shah, PTA

## 2018-11-26 NOTE — PROGRESS NOTES
Pt accepted to [facility], pre cert started with [facility] spring, will await response.

## 2018-11-26 NOTE — PROGRESS NOTES
Problem: Interdisciplinary Rounds Goal: Interdisciplinary Rounds Outcome: Progressing Towards Goal 
Interdisciplinary team rounds were held 11/26/2018 with the following team members:Care Management, Nurse Practitioner, Physical Therapy and . Anticipate discharge to [facility] on Wednesday pending insurance approval. 
 
Plan of care discussed. See clinical pathway and/or care plan for interventions and desired outcomes.

## 2018-11-26 NOTE — PROGRESS NOTES
Care Management Interventions PCP Verified by CM: (Saúl Hernandez MD) Mode of Transport at Discharge: ARMANI(Robbi) Transition of Care Consult (CM Consult): SNF(Pt is insured with pharmacy benefits. Will need rehab post hip fx with repair.  ) Partner SNF: No 
Reason Why Partner SNF Not Chosen: Location(Chose referral to [facility] as it is near her home.) Discharge Durable Medical Equipment: No 
Physical Therapy Consult: Yes Occupational Therapy Consult: Yes Speech Therapy Consult: No 
Current Support Network: Own Home(Pt normally manages her ADL's but does not drive. Her 46 yo grandson resides with her but by pt report provides minimal assistance. Daughter lives in [place].  ) Confirm Follow Up Transport: Family Plan discussed with Pt/Family/Caregiver: Yes Freedom of Choice Offered: Yes [employer] Information Provided?: No 
Discharge Location Discharge Placement: Skilled nursing facility LMSW met with pt about care planning yesterday afternoon. Referral for rehab sent to [facility] as pt lives in this neighborhood and prefers to stay close to home. Await response.

## 2018-11-26 NOTE — PROGRESS NOTES
Problem: Self Care Deficits Care Plan (Adult) Goal: *Acute Goals and Plan of Care (Insert Text) 1. Patient will perform bathing with minimal assistance within 7 days with equipment as needed. 2.  Patient will perform lower body dressing with moderate assistance within 7 days with equipment as needed. 3.  Patient will perform toileting with moderate assistance within 7 days with equipment as needed. 4.   Patient will perform toilet transfer with minimal assistance within 7 days with equipment as needed. 5.   Pt will participate in B UE therapeutic exercises for 8 minutes with 3 rest breaks within 7 days. 6.  Pt and or caregiver to demonstrate and verbalize good understanding of recommendations for increasing safety with functional tasks upon discharge. OCCUPATIONAL THERAPY: Initial Assessment, Treatment Day: Day of Assessment and AM 11/26/2018 INPATIENT: Hospital Day: 2 Payor: Ashley Kearney / Plan: [place] [employer] / Product Type: Managed Care Medicare /  
  
NAME/AGE/GENDER: Yoselin Parker is a 81 y.o. female PRIMARY DIAGNOSIS:  Closed left hip fracture (HCC) Closed left hip fracture (HCC) Closed left hip fracture ([address] Procedure(s) (LRB): FEMUR INSERTION INTRA MEDULLARY NAIL (Left) 1 Day Post-Op ICD-10: Treatment Diagnosis:  
 · Generalized Muscle Weakness (M62.81) · History of falling (Z91.81) Precautions/Allergies: 
   Patient has no known allergies. ASSESSMENT:  
 
Ms. Kera Bass is s/p L Femur Insertion Intra Medullary Nail. Note:  Patient is now WBAT L LE. Patient lives at home and her 45year old grandson lives with her. Per patient, her grandson does not work, and she has to buy food for his 2 snakes and his 2 large dogs, though he is able to  groceries. Prior to admit, Patient was independent with ADLs and she ambulated using SPC sometimes in her house. Patient sitting in chair upon entrance.   Patient presents with functional B UE AROM, though she has generalized weakness throughout. Patient functioning below baseline, and patient to benefit from Occupational Therapy to maximize ADL performance. Recommend post acute rehab services. Cont OT per tx plan. This section established at most recent assessment PROBLEM LIST (Impairments causing functional limitations): 1. Decreased Strength 2. Decreased ADL/Functional Activities 3. Decreased Transfer Abilities 4. Decreased Ambulation Ability/Technique 5. Decreased Balance 6. Increased Pain 7. Decreased Activity Tolerance 8. Decreased Pacing Skills 9. Decreased Work Simplification/Energy Conservation Techniques 10. Increased Fatigue 11. Increased Shortness of Breath 12. Decreased [place] with Home Exercise Program 
13. Decreased Cognition INTERVENTIONS PLANNED: (Benefits and precautions of occupational therapy have been discussed with the patient.) 1. Activities of daily living training 2. Adaptive equipment training 3. Balance training 4. Clothing management 5. Cognitive training 6. Donning&doffing training 7. Group therapy 8. Hygiene training 9. Medication management training 10. Neuromuscular re-eduation 11. Re-evaluation 12. Sensory reintegration training 13. Therapeutic activity 14. Therapeutic exercise TREATMENT PLAN: Frequency/Duration: Follow patient 5x's /wk to address above goals. Rehabilitation Potential For Stated Goals: Good RECOMMENDED REHABILITATION/EQUIPMENT: (at time of discharge pending progress): Due to the probability of continued deficits (see above) this patient will likely need continued skilled occupational therapy after discharge. Equipment:  
? None at this time OCCUPATIONAL PROFILE AND HISTORY:  
History of Present Injury/Illness (Reason for Referral): 
Patient is an 82 y/o female with hx HTN, hypothyroidism, osteoporosis, COPD, continued smoking who presents to ED with left hip and knee pain after having a fall at home 1 hr PTA. She states she was leaning down to place a new bowl of water for her cats and lost balance. Did strike head but no LOC. Not on blood thinners. Left knee very swollen. She states needs a knee replacement but no acute fracture. Does have a left intertrochanteric fracture. No chest pain or cardiac history. Has had cough with bronchitis symptoms for several days. No excess shortness of breath. Vital signs are stable. Will admit for surgical repair of left hip fracture and treat the acute bronchitis. No pneumonia on chest xray.  
  
 
 
Past Medical History/Comorbidities: Ms. Gigi Angeles  has no past medical history on file. Ms. Gigi Angeles  has a past surgical history that includes FEMUR INSERTION INTRA MEDULLARY NAIL (Left, 11/25/2018). Social History/Living Environment:  
Home Environment: Private residence # Steps to Enter: 5 One/Two Story Residence: One story Living Alone: Yes Support Systems: Family member(s), Friends \ neighbors Patient Expects to be Discharged to[de-identified] Skilled nursing facility Current DME Used/Available at Home: None Prior Level of Function/Work/Activity: 
Prior to admit, Patient was independent with ADLs and she ambulated using SPC sometimes in her house. Per patient, her grandson does not work, and she has to buy food for his 2 snakes and his 2 large dogs, though he is able to  groceries. Number of Personal Factors/Comorbidities that affect the Plan of Care: Expanded review of therapy/medical records (1-2):  MODERATE COMPLEXITY ASSESSMENT OF OCCUPATIONAL PERFORMANCE[de-identified]  
Activities of Daily Living:  
Basic ADLs (From Assessment) Complex ADLs (From Assessment) Feeding: Setup Oral Facial Hygiene/Grooming: Setup Bathing: Moderate assistance Upper Body Dressing: Moderate assistance Lower Body Dressing: Total assistance Toileting: Total assistance Instrumental ADL Meal Preparation: Maximum assistance Homemaking: Maximum assistance Medication Management: Minimum assistance Grooming/Bathing/Dressing Activities of Daily Living Cognitive Retraining Safety/Judgement: Awareness of environment; Fall prevention Functional Transfers Toilet Transfer : Assist x2; Moderate assistance(Chair to BSC t/f using RW; pt WBAT L LE & knee immobilizer ) Bed/Mat Mobility Supine to Sit: Maximum assistance; Additional time Sit to Stand: Moderate assistance;Minimum assistance Most Recent Physical Functioning:  
Gross Assessment: 
  
         
  
Posture: 
Posture (WDL): Exceptions to [facility] Posture Assessment: Trunk flexion Balance: 
Sitting: Impaired Sitting - Static: Good (unsupported) Sitting - Dynamic: Fair (occasional) Standing: Impaired Standing - Static: Fair Standing - Dynamic : Fair(poor once fatigued) Bed Mobility: 
Supine to Sit: Maximum assistance; Additional time Wheelchair Mobility: 
  
Transfers: 
Sit to Stand: Moderate assistance;Minimum assistance Stand to Sit: Minimum assistance; Moderate assistance Patient Vitals for the past 6 hrs: 
 BP BP Patient Position SpO2 O2 Flow Rate (L/min) Pulse 18 0805 119/67 At rest 90 %  72  
18 0933   (!) 89 % 2 l/min   
18 1245 108/71 Sitting 90 %  86 Mental Status Neurologic State: Alert Orientation Level: Oriented X4 Cognition: Follows commands Perception: Appears intact Perseveration: No perseveration noted Safety/Judgement: Awareness of environment, Fall prevention Physical Skills Involved: 
1. Balance 2. Strength 3. Activity Tolerance 4. Pain (acute) Cognitive Skills Affected (resulting in the inability to perform in a timely and safe manner): 1. Executive Function 2. Divided Attention Psychosocial Skills Affected: 1. Habits/Routines 2. Social Interaction Number of elements that affect the Plan of Care: 3-5:  MODERATE COMPLEXITY CLINICAL DECISION MAKIN [facility] [place] [address] [id number] AM-PAC 6 Clicks Daily Activity Inpatient Short Form How much help from another person does the patient currently need. .. Total A Lot A Little None 1. Putting on and taking off regular lower body clothing? [x] 1   [] 2   [] 3   [] 4  
2. Bathing (including washing, rinsing, drying)? [] 1   [x] 2   [] 3   [] 4  
3. Toileting, which includes using toilet, bedpan or urinal?   [x] 1   [] 2   [] 3   [] 4  
4. Putting on and taking off regular upper body clothing? [] 1   [x] 2   [] 3   [] 4  
5. Taking care of personal grooming such as brushing teeth? [] 1   [] 2   [x] 3   [] 4  
6. Eating meals? [] 1   [] 2   [x] 3   [] 4  
© 2007, Trustees of [facility] [employer], under license to [employer]. All rights reserved Score:  Initial: CL   12 Most Recent: X (Date: -- ) Interpretation of Tool:  Represents activities that are increasingly more difficult (i.e. Bed mobility, Transfers, Gait). Score 24 23 22-20 19-15 14-10 9-7 6 Modifier CH CI CJ CK CL CM CN   
 
? Self Care:  
  - CURRENT STATUS: CL - 60%-79% impaired, limited or restricted  - GOAL STATUS: CK - 40%-59% impaired, limited or restricted  - D/C STATUS:  ---------------To be determined--------------- Payor: Nimisha Harper / Plan: [employer] / Product Type: [employer] Medicare /   
 
Medical Necessity:    
· Patient demonstrates good rehab potential due to higher previous functional level. Reason for Services/Other Comments: 
· Patient continues to require skilled intervention due to patient's inability to take care of self. Use of outcome tool(s) and clinical judgement create a POC that gives a: MODERATE COMPLEXITY  
 
 
 
TREATMENT:  
(In addition to Assessment/Re-Assessment sessions the following treatments were rendered) Pre-treatment Symptoms/Complaints:   
Pain: Initial:  
Pain Intensity 1: 0 8 Post Session:  8 Therapeutic Activity: (    9 minutes):   Therapeutic activities including Chair transfers and Toilet transfers to improve mobility, strength, balance and coordination. Required moderate assist x 2 Safety awareness training; Tactile cues; Verbal cues;Manual cues to promote static and dynamic balance in standing. Braces/Orthotics/Lines/Etc:  
· IV 
· O2 Device: Nasal cannula Treatment/Session Assessment:   
· Response to Treatment:  positive · Interdisciplinary Collaboration:  
o Physical Therapist 
o Physical Therapy Assistant 
o Certified Nursing Assistant/Patient Care Technician · After treatment position/precautions:  
o Up in chair 
o Bed/Chair-wheels locked 
o Bed in low position 
o Call light within reach · Compliance with Program/Exercises: Compliant all of the time. · Recommendations/Intent for next treatment session: \"Next visit will focus on advancements to more challenging activities and reduction in assistance provided\". Total Treatment Duration: OT Patient Time In/Time Out Time In: 6501 Time Out: 1110 Gracie Arana OT

## 2018-11-26 NOTE — PROGRESS NOTES
Problem: Mobility Impaired (Adult and Pediatric) Goal: *Acute Goals and Plan of Care (Insert Text) STG: 
(1.)Ms. Zion Bob will move from supine to sit and sit to supine , scoot up and down and roll side to side with STAND BY ASSIST within 4 treatment day(s). (2.)Ms. Zion Bob will transfer from bed to chair and chair to bed with MINIMAL ASSIST using the least restrictive device within 4 treatment day(s). (3.)Ms. Zion Bob will ambulate with MINIMAL ASSIST for 5 feet with the least restrictive device NWB within 4 treatment day(s). LTG: 
(1.)Ms. Zion Bob will move from supine to sit and sit to supine , scoot up and down and roll side to side in bed with SUPERVISION within 8 treatment day(s). (2.)Ms. Zion Bob will transfer from bed to chair and chair to bed with STAND BY ASSIST using the least restrictive device within 8 treatment day(s). (3.)Ms. Zion Bob will ambulate with STAND BY ASSIST for 25+ feet with the least restrictive device NWB within 8 treatment day(s). ________________________________________________________________________________________________ PHYSICAL THERAPY: Daily Note, Treatment Day: 1st, AM 11/26/2018 INPATIENT: Hospital Day: 2 Payor: Jonah Claros / Plan: [employer] / Product Type: Managed Care Medicare /  
  
NAME/AGE/GENDER: Sherrell Flood is a 81 y.o. female PRIMARY DIAGNOSIS: Closed left hip fracture (HCC) Closed left hip fracture (HCC) Closed left hip fracture ([address] Procedure(s) (LRB): FEMUR INSERTION INTRA MEDULLARY NAIL (Left) 1 Day Post-Op ICD-10: Treatment Diagnosis:  
 · Generalized Muscle Weakness (M62.81) · Difficulty in walking, Not elsewhere classified (R26.2) Precaution/Allergies: 
Patient has no known allergies. ASSESSMENT:  
 
Ms. Zion Bob was supine upon contact and agreeable to PT.  Patient able to perform supine to sit with max assist, additional time, and cues for improved/proper technique. Instructed patient in proper sit to stand technique while maintaining NWB status on LLE but patient had difficulty maintaining during functional transfers requiring max assist x 2 to achieve standing. Patient had difficulty maintaining NWB status on LLE during SPT to recliner chair requiring max assist x 2 to complete transfer as well as manual assistance from therapist to maintain NWB status. Patient then participates in therapeutic strengthening exercises to improve functional strength for transfers, gait and overall mobility. Patient requires cues and assistance to perform exercises correctly. Overall slow progress towards physical therapy goals. No goals have been met thus far. Will continue efforts. This section established at most recent assessment PROBLEM LIST (Impairments causing functional limitations): 1. Decreased Strength 2. Decreased ADL/Functional Activities 3. Decreased Transfer Abilities 4. Decreased Ambulation Ability/Technique 5. Decreased Balance 6. Decreased Activity Tolerance 7. Decreased Pacing Skills 8. Increased Fatigue 9. Increased Shortness of Breath 10. Decreased Flexibility/Joint Mobility 11. Decreased Knowledge of Precautions INTERVENTIONS PLANNED: (Benefits and precautions of physical therapy have been discussed with the patient.) 1. Balance Exercise 2. Bed Mobility 3. Gait Training 4. Neuromuscular Re-education/Strengthening 5. Range of Motion (ROM) 6. Therapeutic Activites 7. Therapeutic Exercise/Strengthening 8. Transfer Training TREATMENT PLAN: Frequency/Duration: twice daily for duration of hospital stay Rehabilitation Potential For Stated Goals: Fair RECOMMENDED REHABILITATION/EQUIPMENT: (at time of discharge pending progress): Due to the probability of continued deficits (see above) this patient will likely need continued skilled physical therapy after discharge. Equipment:  
? None at this time HISTORY:  
History of Present Injury/Illness (Reason for Referral): 
80 y/o female with hx HTN, hypothyroidism, osteoporosis, COPD, continued smoking who presents to ED with left hip and knee pain after having a fall at home 1 hr PTA. She states she was leaning down to place a new bowl of water for her cats and lost balance. Did strike head but no LOC. Not on blood thinners. Left knee very swollen. She states needs a knee replacement but no acute fracture. Does have a left intertrochanteric fracture. No chest pain or cardiac history. Has had cough with bronchitis symptoms for several days. No excess shortness of breath. Vital signs are stable. Will admit for surgical repair of left hip fracture and treat the acute bronchitis. No pneumonia on chest xray. Past Medical History/Comorbidities: Ms. Che Chicas  has no past medical history on file. Ms. Che Chicas  has no past surgical history on file. Social History/Living Environment:  
Home Environment: Private residence # Steps to Enter: 5 One/Two Story Residence: One story Living Alone: Yes Support Systems: Family member(s), Friends \ neighbors Patient Expects to be Discharged to[de-identified] Skilled nursing facility Current DME Used/Available at Home: None Prior Level of Function/Work/Activity: 
Pt reports having SPC she uses mostly outside and sometimes indoors in her R hand. Does not wonder outside home much. Number of Personal Factors/Comorbidities that affect the Plan of Care: 1-2: MODERATE COMPLEXITY EXAMINATION:  
Most Recent Physical Functioning:  
Gross Assessment: 
  
         
  
Posture: 
  
Balance: 
Sitting: Impaired Sitting - Static: Fair (occasional) Sitting - Dynamic: Fair (occasional) Standing: Impaired Standing - Static: Poor Standing - Dynamic : Poor Bed Mobility: 
Supine to Sit: Maximum assistance; Additional time Wheelchair Mobility: 
  
Transfers: 
Sit to Stand: Maximum assistance;Assist x2 Stand to Sit: Maximum assistance;Assist x2 
 Gait: 
Left Side Weight Bearing: Non-weight bearing Body Structures Involved: 1. Nerves 2. Bones 3. Joints 4. Muscles 5. Ligaments Body Functions Affected: 1. Mental 
2. Sensory/Pain 3. Neuromusculoskeletal 
4. Movement Related Activities and Participation Affected: 1. General Tasks and Demands 2. Mobility 3. Self Care 4. Domestic Life 5. Community, Social and [place] Number of elements that affect the Plan of Care: 3: MODERATE COMPLEXITY CLINICAL PRESENTATION:  
Presentation: Evolving clinical presentation with changing clinical characteristics: MODERATE COMPLEXITY CLINICAL DECISION MAKIN[address] [place] [address] AM-PAC 6 Clicks Basic Mobility Inpatient Short Form How much difficulty does the patient currently have. .. Unable A Lot A Little None 1. Turning over in bed (including adjusting bedclothes, sheets and blankets)? [] 1   [x] 2   [] 3   [] 4  
2. Sitting down on and standing up from a chair with arms ( e.g., wheelchair, bedside commode, etc.)   [] 1   [] 2   [x] 3   [] 4  
3. Moving from lying on back to sitting on the side of the bed? [] 1   [] 2   [x] 3   [] 4 How much help from another person does the patient currently need. .. Total A Lot A Little None 4. Moving to and from a bed to a chair (including a wheelchair)? [] 1   [x] 2   [] 3   [] 4  
5. Need to walk in hospital room? [] 1   [x] 2   [] 3   [] 4  
6. Climbing 3-5 steps with a railing? [x] 1   [] 2   [] 3   [] 4  
© , Trustees of [address] [place] [address], under license to [employer]. All rights reserved Score:  Initial: 13 Most Recent: X (Date: -- ) Interpretation of Tool:  Represents activities that are increasingly more difficult (i.e. Bed mobility, Transfers, Gait). Score 24 23 22-20 19-15 14-10 9-7 6 Modifier CH CI CJ CK CL CM CN   
 
? Mobility - Walking and Moving Around:  
  - CURRENT STATUS: CL - 60%-79% impaired, limited or restricted  - GOAL STATUS: CK - 40%-59% impaired, limited or restricted  - D/C STATUS:  ---------------To be determined--------------- Payor: Chuy Stake / Plan: [place] [employer] / Product Type: Managed Care Medicare /   
 
Medical Necessity:    
· Skilled intervention continues to be required due to decreased function. Reason for Services/Other Comments: 
· Patient continues to require skilled intervention due to medical complications and patient unable to attend/participate in therapy as expected. Use of outcome tool(s) and clinical judgement create a POC that gives a: Questionable prediction of patient's progress: MODERATE COMPLEXITY  
  
 
 
 
TREATMENT:  
(In addition to Assessment/Re-Assessment sessions the following treatments were rendered) Pre-treatment Symptoms/Complaints:  none Pain: Initial:  
Pain Intensity 1: 0  Post Session:  0 Therapeutic Activity: (    15 Minutes): Therapeutic activities including bed mobility training, transfer training, SPT training, static/dynamic sitting/standing balance training, posture training, instruction in sequencing with rolling walker, and patient education to improve mobility, strength and balance. Required moderate verbal, tactile and manual cues   to promote static and dynamic balance in standing and promote coordination of bilateral, lower extremity(s). Therapeutic Exercise: (  10 Minutes):  Exercises per grid below to improve mobility, strength and balance. Required moderate visual, verbal, manual and tactile cues to promote proper body alignment, promote proper body posture and promote proper body mechanics. Progressed range, repetitions and complexity of movement as indicated. Date: 
11/26/18 Date: 
 Date: 
  
ACTIVITY/EXERCISE AM PM AM PM AM PM  
Ambulation:           Distance Device Duration Seated Heel Raises 2x10B A Seated Toe Raises 2x10B A Seated quad sets x10B A       
 Seated glut sets x10B A Seated Hip Abduction x10B  AA       
        
B = bilateral; AA = active assistive; A = active; P = passive Braces/Orthotics/Lines/Etc:  
· O2 Device: Nasal cannula Treatment/Session Assessment:   
· Response to Treatment:  See above · Interdisciplinary Collaboration:  
o Physical Therapy Assistant 
o Registered Nurse 
o Rehabilitation Attendant · After treatment position/precautions:  
o Up in chair 
o Bed alarm/tab alert on 
o Bed/Chair-wheels locked 
o Call light within reach 
o RN notified · Compliance with Program/Exercises: Will assess as treatment progresses · Recommendations/Intent for next treatment session: \"Next visit will focus on advancements to more challenging activities and reduction in assistance provided\". Total Treatment Duration: PT Patient Time In/Time Out Time In: 8387 Time Out: 5233 Juan Shah, PTA

## 2018-11-27 NOTE — DISCHARGE SUMMARY
Hospitalist Discharge Summary Admit Date:  2018  1:47 AM  
Name:  Ngozi Patient Age:  81 y.o. 
:  1935 MRN:  [id number] PCP:  Orlando Sanderson MD 
Treatment Team: Attending Provider: Williams Mas MD; Consulting Provider: Elvia Pacheco MD; Care Manager: Elizabeth Faith RN; Charge Nurse: Reese Kaur; Utilization Review: Rhianna Kasper RN, [facility], [facility]- 
 
PROBLEMS:   
Mechanical fall Comminuted, displaced intertrochanteric fracture left hip. S/P insertion intra medullary nail left femur  Acute left knee sprain sustained in fall Hypothyroidism HTN 
COPD Long standing tobacco abuse Osteoporosis Dehydration:  Resoved Acute bronchitis:  resolving Acute UTI: treating: Urine culture negative to date Hospital Course: 
Patient presented to ER early 18 after she tripped while getting water for her animals, falling to floor and landing on her side with hip and knee pain. Left knee with immediate pain, deformity, notable edema  and inability to bear weight. Xray indicated no knee fracture, however she was found with left comminuted, displaced intertrochanteric fracture of the hip. Right knee without acute findings. Also noted with notable COPD with congested breath sounds and cough, bronchitis. Begun on rocephin. Long time smoker. Underwent insertion of intramedullary nail left femur  am per Dr Dr Tahira Rios, did well intraoperatively and in the immediate post op period with stable vitals after initial hypotension, pain controlled and no nausea. She was found by nurses smoking in bed. Contrary. To MRI for knee, results above. . Found with UTI on initial cath, on rocephin. Working with PT/OT. Cough easing with tessalon, oxygen prn, nebs. Will rx all for rehab as needed. Follow up instructions and discharge meds at bottom of this note.   Plan was discussed with patient, Rn, Family. All questions answered. Patient was stable at time of discharge. Diagnostic Imaging/Tests: LEFT FEMUR:  2  views of the left femur demonstrate a comminuted, displaced 
intertrochanteric fracture of the proximal femur. The mid and distal femur are intact. The knee is anatomically aligned. The soft tissues are normal..    
IMPRESSION: Intertrochanteric fracture. LEFT TIBIA AND FIBULA: Tibia and fibula are intact. Ankles anatomically aligned. Soft tissues are normal. Plantar calcaneal spur. IMPRESSION:  No fractures are identified. 
  
LEFT HIP:  An AP view of the pelvis and a frogleg lateral view of the left hip demonstrate a comminuted, displaced intertrochanteric fracture. Remaining pelvic 
bones are intact. .   
IMPRESSION: Comminuted, displaced intertrochanteric fracture. 
  
CXR:  A portable AP radiograph of the chest was obtained at 0219 hours. The patient is on a cardiac monitor.  Calcified granuloma in the right lower lobe. Minimal linear atelectasis of the left lower lobe.  The cardiac and mediastinal contours and pulmonary vascularity are normal.  The bones and soft tissues are grossly within normal limits. IMPRESSION: Minimal linear atelectasis of the left lower lobe. 
  
LEFT KNEE: Three views of the left knee demonstrate a chronic, benign deformity of the proximal tibia. Degenerative changes of the DIP. No evidence of a joint 
effusion. .   
IMPRESSION: No acute abnormalities. 11/25:  MRI LEFT KNEE:  Very large, complex fluid collection along the anterolateral aspect of the knee extending into the lower leg most in keeping with a subcutaneous hematoma. no underlying bone contusion or fracture. Advanced tricompartmental osteoarthritis. Degenerative tearing through the body and posterior horn of the medial meniscus. Degenerative tearing of the body and posterior horn of the lateral meniscus Trace Baker's cyst 
 
All Micro Results MSSA/MRSA SC BY PCR, NASAL SWAB [[id number]]  (Abnormal) Collected:  11/25/18 0442 Order Status:  Completed Specimen:  Nasal Updated:  11/25/18 1502 Special Requests: NO SPECIAL REQUESTS Culture result: MRSA target DNA not detected, SA target DNA detected. A MRSA negative, SA positive test result does not preclude MRSA nasal colonization. Urine culture:  No growth to date Labs: Results:  
   
BMP, Mg, Phos Recent Labs  
  11/26/18 
0507 11/25/18 
0315 11/25/18 
0151   --  141  
K 4.1  --  3.8   --  105 CO2 30  --  28 AGAP 5*  --  8  
BUN 36*  --  31* CREA 0.82  --  1.22* CA 7.6* 8.2* 8.3 *  --  141* MG 2.1  --   --   
  
CBC Recent Labs  
  11/27/18 
0520 11/26/18 
0507 11/25/18 
0151 WBC 12.1* 11.9* 10.8 RBC 2.89* 3.04* 4.70 HGB 8.9* 10.0* 14.6 HCT 28.5* 30.2* 44.9  179 210 GRANS 70 74  --   
LYMPH 19 19  --   
EOS 3 0*  --   
MONOS 7 6  --   
BASOS 0 0  --   
IG 1 0  --   
ANEU 8.5* 8.7*  --   
ABL 2.3 2.2  --   
XOCHILT 0.4 0.0  -- ABM 0.9 0.8  --   
ABB 0.1 0.1  --   
AIG 0.1 0.1  --   
  
Coagulation Tests Lab Results Component Value Date/Time Prothrombin time 12.1 11/25/2018 01:51 AM  
 INR 0.9 11/25/2018 01:51 AM  
 aPTT <20.0 (L) 11/25/2018 01:51 AM  
  
Most Recent UA Lab Results Component Value Date/Time  Color JEFRY 11/25/2018 03:19 AM  
 Appearance TURBID 11/25/2018 03:19 AM  
 Specific gravity 1.026 (H) 11/25/2018 03:19 AM  
 pH (UA) 5.5 11/25/2018 03:19 AM  
 Protein 30 (A) 11/25/2018 03:19 AM  
 Glucose NEGATIVE  11/25/2018 03:19 AM  
 Ketone TRACE (A) 11/25/2018 03:19 AM  
 Bilirubin SMALL (A) 11/25/2018 03:19 AM  
 Blood LARGE (A) 11/25/2018 03:19 AM  
 Urobilinogen 1.0 11/25/2018 03:19 AM  
 Nitrites NEGATIVE  11/25/2018 03:19 AM  
 Leukocyte Esterase NEGATIVE  11/25/2018 03:19 AM  
 WBC 5-10 11/25/2018 03:19 AM  
 RBC 10-20 11/25/2018 03:19 AM  
 Epithelial cells 0-3 11/25/2018 03:19 AM  
 Bacteria 0 11/25/2018 03:19 AM  
 Casts 10-20 11/25/2018 03:19 AM  
  
 
No Known Allergies Immunization History Administered Date(s) Administered  Influenza Vaccine 04/10/2015, 11/12/2015  Influenza Vaccine (Quad) Mdck Pf 11/21/2017  Influenza Vaccine (Quad) PF 01/31/2017, 09/11/2018  Pneumococcal Conjugate (PCV-13) 08/08/2017  Pneumococcal Polysaccharide (PPSV-23) 03/10/2015, 07/18/2016  TB Skin Test (PPD) Intradermal 11/25/2018 All Labs from Last 24 Hrs: 
Recent Results (from the past 24 hour(s)) PLEASE READ & DOCUMENT PPD TEST IN 48 HRS Collection Time: 11/27/18  4:40 AM  
Result Value Ref Range PPD negative Negative  
 mm Induration 0 mm CBC WITH AUTOMATED DIFF Collection Time: 11/27/18  5:20 AM  
Result Value Ref Range WBC 12.1 (H) 4.3 - 11.1 K/uL  
 RBC 2.89 (L) 4.05 - 5.2 M/uL HGB 8.9 (L) 11.7 - 15.4 g/dL HCT 28.5 (L) 35.8 - 46.3 % MCV 98.6 (H) 79.6 - 97.8 FL  
 MCH 30.8 26.1 - 32.9 PG  
 MCHC 31.2 (L) 31.4 - 35.0 g/dL  
 RDW 12.9 % PLATELET 949 230 - 347 K/uL MPV 11.2 9.4 - 12.3 FL ABSOLUTE NRBC 0.00 0.0 - 0.2 K/uL  
 DF AUTOMATED NEUTROPHILS 70 43 - 78 % LYMPHOCYTES 19 13 - 44 % MONOCYTES 7 4.0 - 12.0 % EOSINOPHILS 3 0.5 - 7.8 % BASOPHILS 0 0.0 - 2.0 % IMMATURE GRANULOCYTES 1 0.0 - 5.0 %  
 ABS. NEUTROPHILS 8.5 (H) 1.7 - 8.2 K/UL  
 ABS. LYMPHOCYTES 2.3 0.5 - 4.6 K/UL  
 ABS. MONOCYTES 0.9 0.1 - 1.3 K/UL  
 ABS. EOSINOPHILS 0.4 0.0 - 0.8 K/UL  
 ABS. BASOPHILS 0.1 0.0 - 0.2 K/UL  
 ABS. IMM. GRANS. 0.1 0.0 - 0.5 K/UL Current Med List in Hospital:  
Current Facility-Administered Medications Medication Dose Route Frequency  levothyroxine (SYNTHROID) tablet 125 mcg  125 mcg Oral ACB  lisinopril (PRINIVIL, ZESTRIL) tablet 5 mg  5 mg Oral DAILY  albuterol-ipratropium (DUO-NEB) 2.5 MG-0.5 MG/3 ML  3 mL Nebulization Q4H PRN  
 oxyCODONE IR (ROXICODONE) tablet 5 mg  5 mg Oral Q4H PRN  
  traMADol (ULTRAM) tablet 50 mg  50 mg Oral Q6H PRN  
 cefTRIAXone (ROCEPHIN) 1 g in 0.9% sodium chloride (MBP/ADV) 50 mL  1 g IntraVENous Q24H  predniSONE (DELTASONE) tablet 20 mg  20 mg Oral DAILY WITH BREAKFAST  lactated Ringers infusion  75 mL/hr IntraVENous CONTINUOUS  
 sodium chloride (NS) flush 5-10 mL  5-10 mL IntraVENous Q8H  
 sodium chloride (NS) flush 5-10 mL  5-10 mL IntraVENous PRN  
 acetaminophen (TYLENOL) tablet 650 mg  650 mg Oral Q8H  
 ondansetron (ZOFRAN) injection 4 mg  4 mg IntraVENous Q4H PRN  
 docusate sodium (COLACE) capsule 100 mg  100 mg Oral BID  alum-mag hydroxide-simeth (MYLANTA) oral suspension 30 mL  30 mL Oral Q4H PRN  
 calcium-vitamin D (OS-YEISON) 500 mg-200 unit tablet  1 Tab Oral TID WITH MEALS  enoxaparin (LOVENOX) injection 30 mg  30 mg SubCUTAneous Q24H  
 guaiFENesin ER (MUCINEX) tablet 1,200 mg  1,200 mg Oral Q12H  
 benzonatate (TESSALON) capsule 100 mg  100 mg Oral TID PRN  
 budesonide (PULMICORT) 500 mcg/2 ml nebulizer suspension  500 mcg Nebulization BID RT Discharge Exam: 
Patient Vitals for the past 24 hrs: 
 Temp Pulse Resp BP SpO2  
11/27/18 1156 97.7 °F (36.5 °C) 77 20 108/68 93 % 11/27/18 0746     94 % 11/27/18 0734     90 % 11/27/18 0717 98 °F (36.7 °C) 75 20 122/64 90 % 11/27/18 0327 99 °F (37.2 °C) 82 20 127/74 (!) 89 % 11/26/18 2312 97.6 °F (36.4 °C) 72 20 126/72 92 % 11/26/18 2015     91 % 11/26/18 1952 97.8 °F (36.6 °C) 79 20 107/69 90 % 11/26/18 1617 97.9 °F (36.6 °C) 90 20 118/76 91 % Oxygen Therapy O2 Sat (%): 93 % (11/27/18 1156) Pulse via Oximetry: 76 beats per minute (11/27/18 0746) O2 Device: Nasal cannula;Humidifier (11/27/18 0746) O2 Flow Rate (L/min): 5 l/min (11/27/18 0746) FIO2 (%): 40 % (11/27/18 0746) No intake or output data in the 24 hours ending 11/27/18 1511 Discharge Info:  
Current Discharge Medication List  
  
START taking these medications Details acetaminophen (TYLENOL) 325 mg tablet Take 2 Tabs by mouth every six (6) hours as needed for Pain. Qty: 30 Tab, Refills: 0  
  
albuterol-ipratropium (DUO-NEB) 2.5 mg-0.5 mg/3 ml nebu 3 mL by Nebulization route every four (4) hours as needed. Qty: 30 Nebule, Refills: 0  
  
benzonatate (TESSALON) 100 mg capsule Take 1 Cap by mouth three (3) times daily as needed for Cough for up to 7 days. Qty: 30 Cap, Refills: 0  
  
budesonide (PULMICORT) 0.5 mg/2 mL nbsp 2 mL by Nebulization route two (2) times a day. Qty: 30 Each, Refills: 0  
  
calcium-vitamin D (OYSTER SHELL) 500 mg(1,250mg) -200 unit per tablet Take 1 Tab by mouth three (3) times daily (with meals). Qty: 90 Tab, Refills: 0  
  
docusate sodium (COLACE) 100 mg capsule Take 1 Cap by mouth two (2) times a day. Qty: 60 Cap, Refills: 2  
  
enoxaparin (LOVENOX) 30 mg/0.3 mL injection 0.3 mL by SubCUTAneous route every twenty-four (24) hours. Qty: 26 Syringe, Refills: 0  
  
guaiFENesin ER (MUCINEX) 1,200 mg Ta12 ER tablet Take 1 Tab by mouth every twelve (12) hours. Qty: 20 Tab, Refills: 0  
  
predniSONE (DELTASONE) 10 mg tablet 20 mg q day x 1 
10 mg q day x3 
5 mg q day x 3 Qty: 7 Tab, Refills: 0  
  
traMADol (ULTRAM) 50 mg tablet Take 2 Tabs by mouth every six (6) hours as needed. Max Daily Amount: 400 mg. Qty: 30 Tab, Refills: 0 Associated Diagnoses: Closed left hip fracture, initial encounter ([facility] [address] cefpodoxime (VANTIN) 100 mg tablet Take 1 Tab by mouth two (2) times a day for 3 days. Qty: 6 Tab, Refills: 0 CONTINUE these medications which have NOT CHANGED Details  
levothyroxine (SYNTHROID) 125 mcg tablet Take 1 Tab by mouth Daily (before breakfast). Qty: 90 Tab, Refills: 2 Associated Diagnoses: Acquired hypothyroidism  
  
lisinopril (PRINIVIL, ZESTRIL) 5 mg tablet Take 1 Tab by mouth daily. Qty: 90 Tab, Refills: 3 Associated Diagnoses: Essential hypertension with goal blood pressure less than 130/85 aspirin delayed-release 81 mg tablet Take  by mouth daily. MAY WEIGHT BEAR LEFT LEG, USE IMMOBILIZER FOR COMFORT UNLESS OTHERWISE INSTRUCTED BY ORTHOPEDICS OR PT AT REHAB 
 
ALSO, USE OXYGEN PRN Disposition: SNF Activity: Activity as tolerated and as instructed by Ortho and PT at Rehab Diet: DIET REGULAR Follow-up Appointments Procedures  FOLLOW UP VISIT Appointment in: Other (Specify) FOLLOW UP WITH ORTHO AS INSTRUCTED FOLLOW UP WITH DR Neymar Borden AS USUAL FOLLOW UP WITH ORTHO 12/13/18 at 0730 FOLLOW UP WITH DR Neymar Borden AS USUAL Time spent in patient discharge planning and coordination 45 minutes.  
 
Signed: 
Tamra Harmon NP

## 2018-11-27 NOTE — PROGRESS NOTES
Problem: Mobility Impaired (Adult and Pediatric) Goal: *Acute Goals and Plan of Care (Insert Text) STG Goals: updated 11/26/18 due to upgraded weight bearing status (1.)Ms. Osbaldo Rodriguez will move from supine to sit and sit to supine , scoot up and down and roll side to side with STAND BY ASSIST within 3 treatment day(s). (2.)Ms. Osbaldo Rodriguez will transfer from bed to chair and chair to bed with CONTACT GUARD ASSIST using the least restrictive device within 3 treatment day(s). (3.)Ms. Osbaldo Rodriguez will ambulate with MINIMAL ASSIST for 40 feet with the least restrictive device within 3 treatment day(s). (4.)Ms. Osbaldo Rodriguez will perform standing static and dynamic balance activities x 15 minutes with MINIMAL ASSIST to improve safety within 3 day(s). (5.)Ms. Osbaldo Rodriguez will perform LE exercises with 1 to 2 cues for form within 3 days to improve strength for functional transfers and ambulation. 
  
LTG: 
(1.)Ms. Osbaldo Rodriguez will move from supine to sit and sit to supine , scoot up and down and roll side to side in bed with SUPERVISION within 7 treatment day(s). (2.)Ms. Osbaldo Rodriguez will transfer from bed to chair and chair to bed with STAND BY ASSIST using the least restrictive device within 7 treatment day(s). (3.)Ms. Osbaldo Rodriguez will ambulate with CONTACT GUARD ASSIST for 75 feet with the least restrictive device within 7 treatment day(s). (4.)Ms. Osbaldo Rodriguez will perform standing static and dynamic balance activities x 15 minutes with CONTACT GUARD ASSIST to improve safety within 7 day(s). ________________________________________________________________________________________________ PHYSICAL THERAPY: Daily Note, Treatment Day: 2nd, AM 11/27/2018 INPATIENT: Hospital Day: 3 Payor: Connor Trejo / Plan: [employer] / Product Type: Managed Care Medicare /   
WBAT LLE Keep KI at all times NAME/AGE/GENDER: Elisgeneva Wheatence is a 81 y.o. female PRIMARY DIAGNOSIS: Closed left hip fracture (HCC) Closed left hip fracture (HCC) Closed left hip fracture ([address] Procedure(s) (LRB): FEMUR INSERTION INTRA MEDULLARY NAIL (Left) 2 Days Post-Op ICD-10: Treatment Diagnosis:  
 · Generalized Muscle Weakness (M62.81) · Difficulty in walking, Not elsewhere classified (R26.2) Precaution/Allergies: 
Patient has no known allergies. ASSESSMENT:  
 
Ms. Memory [place] was supine upon contact and agreeable to PT. Patient able to perform supine to sit and transfer to standing with mod assist, additional time, and cues for improved/proper technique. Attempted gait training once standing but patient unable to bear weight on LLE to advance RLE. Patient able to perform heel toe slides (shuffling) to recliner chair x 2' with use of rolling walker, min assist and occasional cues for sequencing with rolling walker. Patient then participates in therapeutic strengthening exercises to improve functional strength for transfers, gait and overall mobility. Patient requires cues and assistance to perform exercises correctly. Overall slow progress towards physical therapy goals. No goals have been met thus far. Will continue efforts. This section established at most recent assessment PROBLEM LIST (Impairments causing functional limitations): 1. Decreased Strength 2. Decreased ADL/Functional Activities 3. Decreased Transfer Abilities 4. Decreased Ambulation Ability/Technique 5. Decreased Balance 6. Decreased Activity Tolerance 7. Decreased Pacing Skills 8. Increased Fatigue 9. Increased Shortness of Breath 10. Decreased Flexibility/Joint Mobility 11. Decreased Knowledge of Precautions INTERVENTIONS PLANNED: (Benefits and precautions of physical therapy have been discussed with the patient.) 1. Balance Exercise 2. Bed Mobility 3. Gait Training 4. Neuromuscular Re-education/Strengthening 5. Range of Motion (ROM) 6. Therapeutic Activites 7. Therapeutic Exercise/Strengthening 8. Transfer Training TREATMENT PLAN: Frequency/Duration: twice daily for duration of hospital stay Rehabilitation Potential For Stated Goals: Fair RECOMMENDED REHABILITATION/EQUIPMENT: (at time of discharge pending progress): Due to the probability of continued deficits (see above) this patient will likely need continued skilled physical therapy after discharge. Equipment:  
? None at this time HISTORY:  
History of Present Injury/Illness (Reason for Referral): 
82 y/o female with hx HTN, hypothyroidism, osteoporosis, COPD, continued smoking who presents to ED with left hip and knee pain after having a fall at home 1 hr PTA. She states she was leaning down to place a new bowl of water for her cats and lost balance. Did strike head but no LOC. Not on blood thinners. Left knee very swollen. She states needs a knee replacement but no acute fracture. Does have a left intertrochanteric fracture. No chest pain or cardiac history. Has had cough with bronchitis symptoms for several days. No excess shortness of breath. Vital signs are stable. Will admit for surgical repair of left hip fracture and treat the acute bronchitis. No pneumonia on chest xray. Past Medical History/Comorbidities: Ms. Anayeli Shine  has no past medical history on file. Ms. Anayeli Shine  has a past surgical history that includes FEMUR INSERTION INTRA MEDULLARY NAIL (Left, 11/25/2018). Social History/Living Environment:  
Home Environment: Private residence # Steps to Enter: 5 One/Two Story Residence: One story Living Alone: Yes Support Systems: Family member(s), Friends \ neighbors Patient Expects to be Discharged to[de-identified] Skilled nursing facility Current DME Used/Available at Home: None Prior Level of Function/Work/Activity: 
Pt reports having SPC she uses mostly outside and sometimes indoors in her R hand. Does not wonder outside home much. Number of Personal Factors/Comorbidities that affect the Plan of Care: 1-2: MODERATE COMPLEXITY EXAMINATION:  
Most Recent Physical Functioning:  
Gross Assessment: 
  
         
  
Posture: 
  
Balance: 
Sitting: Impaired Sitting - Static: Good (unsupported) Sitting - Dynamic: Fair (occasional) Standing: Impaired Standing - Static: Fair Standing - Dynamic : Fair Bed Mobility: 
Supine to Sit: Moderate assistance; Additional time Wheelchair Mobility: 
  
Transfers: 
Sit to Stand: Moderate assistance Stand to Sit: Minimum assistance Gait: 
Left Side Weight Bearing: As tolerated Base of Support: Widened Speed/Nancy: Shuffled; Slow Step Length: Left shortened;Right shortened Gait Abnormalities: Decreased step clearance;Trunk sway increased; Shuffling gait; Step to gait Distance (ft): 2 Feet (ft) Assistive Device: Walker, rolling Ambulation - Level of Assistance: Minimal assistance; Moderate assistance Interventions: Safety awareness training; Tactile cues; Verbal cues Body Structures Involved: 1. Nerves 2. Bones 3. Joints 4. Muscles 5. Ligaments Body Functions Affected: 1. Mental 
2. Sensory/Pain 3. Neuromusculoskeletal 
4. Movement Related Activities and Participation Affected: 1. General Tasks and Demands 2. Mobility 3. Self Care 4. Domestic Life 5. Community, Social and [place] Number of elements that affect the Plan of Care: 3: MODERATE COMPLEXITY CLINICAL PRESENTATION:  
Presentation: Evolving clinical presentation with changing clinical characteristics: MODERATE COMPLEXITY CLINICAL DECISION MAKING:  
[employer] [facility] 6 Clicks Basic Mobility Inpatient Short Form How much difficulty does the patient currently have. .. Unable A Lot A Little None 1. Turning over in bed (including adjusting bedclothes, sheets and blankets)? [] 1   [x] 2   [] 3   [] 4  
2.   Sitting down on and standing up from a chair with arms ( e.g., wheelchair, bedside commode, etc.)   [] 1   [] 2   [x] 3   [] 4  
3. Moving from lying on back to sitting on the side of the bed? [] 1   [] 2   [x] 3   [] 4 How much help from another person does the patient currently need. .. Total A Lot A Little None 4. Moving to and from a bed to a chair (including a wheelchair)? [] 1   [x] 2   [] 3   [] 4  
5. Need to walk in hospital room? [] 1   [x] 2   [] 3   [] 4  
6. Climbing 3-5 steps with a railing? [x] 1   [] 2   [] 3   [] 4  
© 2007, Trustees of [facility] [employer], under license to [employer]. All rights reserved Score:  Initial: 13 Most Recent: X (Date: -- ) Interpretation of Tool:  Represents activities that are increasingly more difficult (i.e. Bed mobility, Transfers, Gait). Score 24 23 22-20 19-15 14-10 9-7 6 Modifier CH CI CJ CK CL CM CN   
 
? Mobility - Walking and Moving Around:  
  - CURRENT STATUS: CL - 60%-79% impaired, limited or restricted  - GOAL STATUS: CK - 40%-59% impaired, limited or restricted  - D/C STATUS:  ---------------To be determined--------------- Payor: [place] / Plan: [employer] / Product Type: Managed Care Medicare /   
 
Medical Necessity:    
· Skilled intervention continues to be required due to decreased function. Reason for Services/Other Comments: 
· Patient continues to require skilled intervention due to medical complications and patient unable to attend/participate in therapy as expected. Use of outcome tool(s) and clinical judgement create a POC that gives a: Questionable prediction of patient's progress: MODERATE COMPLEXITY  
  
 
 
 
TREATMENT:  
(In addition to Assessment/Re-Assessment sessions the following treatments were rendered) Pre-treatment Symptoms/Complaints:  none Pain: Initial:  
Pain Intensity 1: 0  Post Session:  0 Gait Training (   Minutes):  Gait training to improve and/or restore physical functioning as related to mobility, strength and balance. Ambulated 2 Feet (ft) with Minimal assistance; Moderate assistance using a Walker, rolling and moderate Safety awareness training; Tactile cues; Verbal cues related to their sequencing, walker manipulation, weight shifts, UE support on rolling walker, DEVAUGHN, and foot placement to promote proper body alignment, promote proper body posture and promote proper body mechanics. Instruction in performance of the above deficits to correct overall gait quality and functional mobility. Therapeutic Activity: (     15 Minutes): Therapeutic activities including bed mobility training, transfer training, attempted gait training, static/dynamic sitting/standing balance training, posture training, instruction in sequencing with rolling walker, and patient education to improve mobility, strength and balance. Required moderate verbal, tactile and manual cues Safety awareness training; Tactile cues; Verbal cues to promote static and dynamic balance in standing and promote coordination of bilateral, lower extremity(s). Therapeutic Exercise: (  10 Minutes):  Exercises per grid below to improve mobility, strength and balance. Required moderate visual, verbal, manual and tactile cues to promote proper body alignment, promote proper body posture and promote proper body mechanics. Progressed range, repetitions and complexity of movement as indicated. Date: 
11/26/18 Date: 
11/27/18 Date: 
  
ACTIVITY/EXERCISE AM PM AM PM AM PM  
Ambulation:           Distance Device Duration Seated Heel Raises 2x10B A 2x10B A 2x10B A Seated Toe Raises 2x10B A 2x10B A 2x10B A Seated quad sets x10B A x10B A 2x10B A Seated glut sets x10B A x10B A 2x10B A Seated Hip Abduction x10B  AA x10B AA x10B 
AA-L, A-R     
        
B = bilateral; AA = active assistive; A = active; P = passive Braces/Orthotics/Lines/Etc:  
· O2 Device: Nasal cannula Treatment/Session Assessment:   
· Response to Treatment:  See above · Interdisciplinary Collaboration:  
o Physical Therapy Assistant 
o Registered Nurse · After treatment position/precautions:  
o Up in chair 
o Bed alarm/tab alert on 
o Bed/Chair-wheels locked 
o Call light within reach 
o RN notified · Compliance with Program/Exercises: Will assess as treatment progresses · Recommendations/Intent for next treatment session: \"Next visit will focus on advancements to more challenging activities and reduction in assistance provided\". Total Treatment Duration: PT Patient Time In/Time Out Time In: 2129 Time Out: 5639 Kwaku Shah, PTA

## 2018-11-27 NOTE — PROGRESS NOTES
ORTH FRACTURE PROGRESS NOTE 2018 Admit Date:  
2018 Post Op day: 1 Days Post-Op Subjective:   
Milton Cuellar SITTING UP IN CHAIR  
 
PT/OT:  
Gait:  Gait Base of Support: Widened Speed/Nancy: Shuffled, Slow Step Length: Left shortened, Right shortened Stance: Weight shift Gait Abnormalities: Decreased step clearance, Shuffling gait, Trunk sway increased, Step to gait Ambulation - Level of Assistance: Minimal assistance, Moderate assistance Distance (ft): (8' then 6') Assistive Device: Walker, rolling Interventions: Safety awareness training, Tactile cues, Verbal cues, Manual cues Interventions: Safety awareness training, Tactile cues, Verbal cues, Manual cues Vital Signs:   
Patient Vitals for the past 8 hrs: 
 BP Temp Pulse Resp SpO2  
18 0717 122/64 98 °F (36.7 °C) 75 20 90 % 18 0327 127/74 99 °F (37.2 °C) 82 20 (!) 89 % Temp (24hrs), Av.1 °F (36.7 °C), Min:97.6 °F (36.4 °C), Max:99 °F (37.2 °C) Pain Control:  
Pain Assessment Pain Scale 1: Visual 
Pain Intensity 1: 4 Pain Onset 1: post op Pain Location 1: Knee, Hip Pain Orientation 1: Left Pain Description 1: Burning, Throbbing Pain Intervention(s) 1: Medication (see MAR) Meds: 
 
Current Facility-Administered Medications Medication Dose Route Frequency  levothyroxine (SYNTHROID) tablet 125 mcg  125 mcg Oral ACB  lisinopril (PRINIVIL, ZESTRIL) tablet 5 mg  5 mg Oral DAILY  albuterol-ipratropium (DUO-NEB) 2.5 MG-0.5 MG/3 ML  3 mL Nebulization Q4H PRN  
 oxyCODONE IR (ROXICODONE) tablet 5 mg  5 mg Oral Q4H PRN  
 traMADol (ULTRAM) tablet 50 mg  50 mg Oral Q6H PRN  
 cefTRIAXone (ROCEPHIN) 1 g in 0.9% sodium chloride (MBP/ADV) 50 mL  1 g IntraVENous Q24H  predniSONE (DELTASONE) tablet 20 mg  20 mg Oral DAILY WITH BREAKFAST  lactated Ringers infusion  75 mL/hr IntraVENous CONTINUOUS  
 sodium chloride (NS) flush 5-10 mL  5-10 mL IntraVENous Q8H  
  sodium chloride (NS) flush 5-10 mL  5-10 mL IntraVENous PRN  
 acetaminophen (TYLENOL) tablet 650 mg  650 mg Oral Q8H  
 ondansetron (ZOFRAN) injection 4 mg  4 mg IntraVENous Q4H PRN  
 docusate sodium (COLACE) capsule 100 mg  100 mg Oral BID  alum-mag hydroxide-simeth (MYLANTA) oral suspension 30 mL  30 mL Oral Q4H PRN  
 calcium-vitamin D (OS-YEISON) 500 mg-200 unit tablet  1 Tab Oral TID WITH MEALS  enoxaparin (LOVENOX) injection 30 mg  30 mg SubCUTAneous Q24H  
 guaiFENesin ER (MUCINEX) tablet 1,200 mg  1,200 mg Oral Q12H  
 benzonatate (TESSALON) capsule 100 mg  100 mg Oral TID PRN  
 budesonide (PULMICORT) 500 mcg/2 ml nebulizer suspension  500 mcg Nebulization BID RT  
 
 
LAB:   
Recent Labs  
  11/27/18 
0520  11/25/18 
0151 HCT 28.5*   < > 44.9 HGB 8.9*   < > 14.6 INR  --   --  0.9  
 < > = values in this interval not displayed. 24 Hour Assessment Issues:   
Oriented Discharge Planning: SNF Transfuse PRBC's:   
 
Assessment & Physician's Comment: 
Dressing is clean, dry, and intact Neurovascular checks within normal limits Principal Problem: 
  Closed left hip fracture ([address] (11/25/2018) Active Problems: Hypothyroidism (3/10/2015) HTN (hypertension) (3/10/2015) COPD (chronic obstructive pulmonary disease) ([facility] (3/10/2015) Smoker (3/10/2015) Osteoporosis (5/16/2016) Dehydration (11/25/2018) Acute bronchitis (11/25/2018) Plan:  CONTINUE THERAPY WBAT 
 DC TO SNF FOR REHAB Balta Oakes MD

## 2018-11-27 NOTE — PROGRESS NOTES
Pt discharged from [facility] to [facility] via [employer].

## 2018-11-27 NOTE — PROGRESS NOTES
Patient has received insurance authorization from [facility] for [address] at [facility]. Authorization # [id number]. Hospitalist, DAVID Crane informed. Awaiting room number from admissions coordinator at [facility].

## 2018-11-27 NOTE — PROGRESS NOTES
Problem: Mobility Impaired (Adult and Pediatric) Goal: *Acute Goals and Plan of Care (Insert Text) STG Goals: updated 11/26/18 due to upgraded weight bearing status (1.)Ms. Giulia Velásquez will move from supine to sit and sit to supine , scoot up and down and roll side to side with STAND BY ASSIST within 3 treatment day(s). (2.)Ms. Giulia Velásquez will transfer from bed to chair and chair to bed with CONTACT GUARD ASSIST using the least restrictive device within 3 treatment day(s). (3.)Ms. Giulia Velásquez will ambulate with MINIMAL ASSIST for 40 feet with the least restrictive device within 3 treatment day(s). (4.)Ms. Giulia Velásquez will perform standing static and dynamic balance activities x 15 minutes with MINIMAL ASSIST to improve safety within 3 day(s). (5.)Ms. Giulia Velásquez will perform LE exercises with 1 to 2 cues for form within 3 days to improve strength for functional transfers and ambulation. 
  
LTG: 
(1.)Ms. Giulia Velásquez will move from supine to sit and sit to supine , scoot up and down and roll side to side in bed with SUPERVISION within 7 treatment day(s). (2.)Ms. Giulia Velásquez will transfer from bed to chair and chair to bed with STAND BY ASSIST using the least restrictive device within 7 treatment day(s). (3.)Ms. Giulia Velásquez will ambulate with CONTACT GUARD ASSIST for 75 feet with the least restrictive device within 7 treatment day(s). (4.)Ms. Giulia Velásquez will perform standing static and dynamic balance activities x 15 minutes with CONTACT GUARD ASSIST to improve safety within 7 day(s). ________________________________________________________________________________________________ PHYSICAL THERAPY: Daily Note, Treatment Day: 2nd, PM 11/27/2018 INPATIENT: Hospital Day: 3 Payor: Mervin Sharp / Plan: [place] [employer]Ã¢â€žÂ¢ / Product Type: Managed Care Medicare /   
WBAT LLE Keep KI at all times NAME/AGE/GENDER: Ingrid Francisco is a 81 y.o. female PRIMARY DIAGNOSIS: Closed left hip fracture (HCC) Closed left hip fracture (HCC) Closed left hip fracture ([address] Procedure(s) (LRB): FEMUR INSERTION INTRA MEDULLARY NAIL (Left) 2 Days Post-Op ICD-10: Treatment Diagnosis:  
 · Generalized Muscle Weakness (M62.81) · Difficulty in walking, Not elsewhere classified (R26.2) Precaution/Allergies: 
Patient has no known allergies. ASSESSMENT:  
 
Ms. Tisha Quiñonez was sitting up in recliner chair upon contact and agreeable to PT. Patient able to perform transfer to standing with mod assist, additional time, and cues for improved/proper technique. Attempted gait training once standing but patient unable to bear weight on LLE to advance RLE. Patient able to perform heel toe slides (shuffling) 2' x 2 (to and from [facility]) with use of rolling walker, min assist and occasional cues for sequencing with rolling walker. Demonstrates fair static/dynamic standing balance with UE support on rolling walker. Overall slow progress towards physical therapy goals. No goals have been met thus far. Will continue efforts. This section established at most recent assessment PROBLEM LIST (Impairments causing functional limitations): 1. Decreased Strength 2. Decreased ADL/Functional Activities 3. Decreased Transfer Abilities 4. Decreased Ambulation Ability/Technique 5. Decreased Balance 6. Decreased Activity Tolerance 7. Decreased Pacing Skills 8. Increased Fatigue 9. Increased Shortness of Breath 10. Decreased Flexibility/Joint Mobility 11. Decreased Knowledge of Precautions INTERVENTIONS PLANNED: (Benefits and precautions of physical therapy have been discussed with the patient.) 1. Balance Exercise 2. Bed Mobility 3. Gait Training 4. Neuromuscular Re-education/Strengthening 5. Range of Motion (ROM) 6. Therapeutic Activites 7. Therapeutic Exercise/Strengthening 8. Transfer Training TREATMENT PLAN: Frequency/Duration: twice daily for duration of hospital stay Rehabilitation Potential For Stated Goals: Fair RECOMMENDED REHABILITATION/EQUIPMENT: (at time of discharge pending progress): Due to the probability of continued deficits (see above) this patient will likely need continued skilled physical therapy after discharge. Equipment:  
? None at this time HISTORY:  
History of Present Injury/Illness (Reason for Referral): 
82 y/o female with hx HTN, hypothyroidism, osteoporosis, COPD, continued smoking who presents to ED with left hip and knee pain after having a fall at home 1 hr PTA. She states she was leaning down to place a new bowl of water for her cats and lost balance. Did strike head but no LOC. Not on blood thinners. Left knee very swollen. She states needs a knee replacement but no acute fracture. Does have a left intertrochanteric fracture. No chest pain or cardiac history. Has had cough with bronchitis symptoms for several days. No excess shortness of breath. Vital signs are stable. Will admit for surgical repair of left hip fracture and treat the acute bronchitis. No pneumonia on chest xray. Past Medical History/Comorbidities: Ms. Opal Rausch  has no past medical history on file. Ms. Opal Rausch  has a past surgical history that includes FEMUR INSERTION INTRA MEDULLARY NAIL (Left, 11/25/2018). Social History/Living Environment:  
Home Environment: Private residence # Steps to Enter: 5 One/Two Story Residence: One story Living Alone: Yes Support Systems: Family member(s), Friends \ neighbors Patient Expects to be Discharged to[de-identified] Skilled nursing facility Current DME Used/Available at Home: None Prior Level of Function/Work/Activity: 
Pt reports having SPC she uses mostly outside and sometimes indoors in her R hand. Does not wonder outside home much. Number of Personal Factors/Comorbidities that affect the Plan of Care: 1-2: MODERATE COMPLEXITY EXAMINATION:  
Most Recent Physical Functioning:  
Gross Assessment: Posture: 
  
Balance: 
Sitting: Impaired Sitting - Static: Good (unsupported) Sitting - Dynamic: Fair (occasional) Standing: Impaired Standing - Static: Fair Standing - Dynamic : Fair Bed Mobility: 
Supine to Sit: Moderate assistance; Additional time Wheelchair Mobility: 
  
Transfers: 
Sit to Stand: Moderate assistance Stand to Sit: Minimum assistance Gait: 
Left Side Weight Bearing: As tolerated Base of Support: Widened Speed/Nancy: Shuffled; Slow Step Length: Left shortened;Right shortened Gait Abnormalities: Decreased step clearance;Trunk sway increased; Shuffling gait; Step to gait Distance (ft): (2' x 2) Assistive Device: Walker, rolling Ambulation - Level of Assistance: Minimal assistance; Moderate assistance Interventions: Safety awareness training; Tactile cues; Verbal cues Body Structures Involved: 1. Nerves 2. Bones 3. Joints 4. Muscles 5. Ligaments Body Functions Affected: 1. Mental 
2. Sensory/Pain 3. Neuromusculoskeletal 
4. Movement Related Activities and Participation Affected: 1. General Tasks and Demands 2. Mobility 3. Self Care 4. Domestic Life 5. Community, Social and [place] Number of elements that affect the Plan of Care: 3: MODERATE COMPLEXITY CLINICAL PRESENTATION:  
Presentation: Evolving clinical presentation with changing clinical characteristics: MODERATE COMPLEXITY CLINICAL DECISION MAKING:  
[facility] AM-PAC 6 Clicks Basic Mobility Inpatient Short Form How much difficulty does the patient currently have. .. Unable A Lot A Little None 1. Turning over in bed (including adjusting bedclothes, sheets and blankets)? [] 1   [x] 2   [] 3   [] 4  
2. Sitting down on and standing up from a chair with arms ( e.g., wheelchair, bedside commode, etc.)   [] 1   [] 2   [x] 3   [] 4  
3. Moving from lying on back to sitting on the side of the bed?    [] 1   [] 2   [x] 3   [] 4  
 How much help from another person does the patient currently need. .. Total A Lot A Little None 4. Moving to and from a bed to a chair (including a wheelchair)? [] 1   [x] 2   [] 3   [] 4  
5. Need to walk in hospital room? [] 1   [x] 2   [] 3   [] 4  
6. Climbing 3-5 steps with a railing? [x] 1   [] 2   [] 3   [] 4  
© 2007, Trustees of [facility], under license to [employer]. All rights reserved Score:  Initial: 13 Most Recent: X (Date: -- ) Interpretation of Tool:  Represents activities that are increasingly more difficult (i.e. Bed mobility, Transfers, Gait). Score 24 23 22-20 19-15 14-10 9-7 6 Modifier CH CI CJ CK CL CM CN   
 
? Mobility - Walking and Moving Around:  
  - CURRENT STATUS: CL - 60%-79% impaired, limited or restricted  - GOAL STATUS: CK - 40%-59% impaired, limited or restricted  - D/C STATUS:  ---------------To be determined--------------- Payor: Сергей Hernandez / Plan: [employer] / Product Type: [employer] Medicare /   
 
Medical Necessity:    
· Skilled intervention continues to be required due to decreased function. Reason for Services/Other Comments: 
· Patient continues to require skilled intervention due to medical complications and patient unable to attend/participate in therapy as expected. Use of outcome tool(s) and clinical judgement create a POC that gives a: Questionable prediction of patient's progress: MODERATE COMPLEXITY  
  
 
 
 
TREATMENT:  
(In addition to Assessment/Re-Assessment sessions the following treatments were rendered) Pre-treatment Symptoms/Complaints:  none Pain: Initial:  
Pain Intensity 1: 0  Post Session:  0 Gait Training (   Minutes):  Gait training to improve and/or restore physical functioning as related to mobility, strength and balance. Ambulated (2' x 2) with Minimal assistance; Moderate assistance using a Walker, rolling and moderate Safety awareness training; Tactile cues; Verbal cues related to their sequencing, walker manipulation, weight shifts, UE support on rolling walker, DEVAUGHN, and foot placement to promote proper body alignment, promote proper body posture and promote proper body mechanics. Instruction in performance of the above deficits to correct overall gait quality and functional mobility. Therapeutic Activity: (     13 Minutes): Therapeutic activities including bed mobility training, transfer training from various surface heights, attempted gait training, static/dynamic sitting/standing balance training, posture training, instruction in sequencing with rolling walker, and patient education to improve mobility, strength and balance. Required moderate verbal, tactile and manual cues Safety awareness training; Tactile cues; Verbal cues to promote static and dynamic balance in standing and promote coordination of bilateral, lower extremity(s). Therapeutic Exercise: (  Minutes):  Exercises per grid below to improve mobility, strength and balance. Required moderate visual, verbal, manual and tactile cues to promote proper body alignment, promote proper body posture and promote proper body mechanics. Progressed range, repetitions and complexity of movement as indicated. Date: 
11/26/18 Date: 
11/27/18 Date: 
  
ACTIVITY/EXERCISE AM PM AM PM AM PM  
Ambulation:           Distance Device Duration Seated Heel Raises 2x10B A 2x10B A 2x10B A Seated Toe Raises 2x10B A 2x10B A 2x10B A Seated quad sets x10B A x10B A 2x10B A Seated glut sets x10B A x10B A 2x10B A Seated Hip Abduction x10B  AA x10B AA x10B 
AA-L, A-R     
        
B = bilateral; AA = active assistive; A = active; P = passive Braces/Orthotics/Lines/Etc:  
· O2 Device: Nasal cannula Treatment/Session Assessment:   
· Response to Treatment:  See above · Interdisciplinary Collaboration:  
o Physical Therapy Assistant 
o Registered Nurse · After treatment position/precautions:  
o Up in chair 
o Bed alarm/tab alert on 
o Bed/Chair-wheels locked 
o Call light within reach 
o RN notified 
o Nurse at bedside · Compliance with Program/Exercises: Will assess as treatment progresses · Recommendations/Intent for next treatment session: \"Next visit will focus on advancements to more challenging activities and reduction in assistance provided\". Total Treatment Duration: PT Patient Time In/Time Out Time In: 1868 Time Out: [address], PTA

## 2018-11-27 NOTE — PROGRESS NOTES
Called spoke with Lobo Lopez with [employer], states received information yesterday and pending auth, states will check with medical director and let me know if pt will be approved in about an hour.

## 2018-11-27 NOTE — PROGRESS NOTES
Problem: Falls - Risk of 
Goal: *Absence of Falls Document Vielka Rigoberto Fall Risk and appropriate interventions in the flowsheet. Outcome: Progressing Towards Goal 
Fall Risk Interventions: 
Mobility Interventions: Bed/chair exit alarm, OT consult for ADLs, Patient to call before getting OOB, PT Consult for mobility concerns, PT Consult for assist device competence Medication Interventions: Bed/chair exit alarm, Patient to call before getting OOB, Teach patient to arise slowly Elimination Interventions: Bed/chair exit alarm, Call light in reach, Patient to call for help with toileting needs History of Falls Interventions: Bed/chair exit alarm, Door open when patient unattended Problem: Pressure Injury - Risk of 
Goal: *Prevention of pressure injury Document Shivam Scale and appropriate interventions in the flowsheet. Outcome: Progressing Towards Goal 
Pressure Injury Interventions: 
Sensory Interventions: Assess changes in LOC Moisture Interventions: Absorbent underpads, Limit adult briefs Activity Interventions: Increase time out of bed, Pressure redistribution bed/mattress(bed type), PT/OT evaluation Mobility Interventions: HOB 30 degrees or less, Pressure redistribution bed/mattress (bed type), PT/OT evaluation Nutrition Interventions: Document food/fluid/supplement intake Friction and Shear Interventions: HOB 30 degrees or less

## 2018-11-27 NOTE — PROGRESS NOTES
Received Neville from Eddie Wilhelm with [employer] approval auth # [id number] pt medically ready for DC today, pt will be DC to [facility] via [facility] at 430 pm, report number given to primary [employer] Venkat. Pt made aware she will be DC to [facility] today and agreeable.

## 2018-11-27 NOTE — PROGRESS NOTES
TRANSFER - OUT REPORT: 
 
Verbal report given to Kimberly(name) on Clifton Yang  being transferred to [facility], Room 10(unit) for routine progression of care Report consisted of patients Situation, Background, Assessment and  
Recommendations(SBAR). Information from the following report(s) SBAR, Kardex, Procedure Summary, Intake/Output, MAR and Quality Measures was reviewed with the receiving nurse. Lines:  
Peripheral IV 11/25/18 Posterior;Right Hand (Active) Site Assessment Clean, dry, & intact 11/27/2018  8:35 AM  
Phlebitis Assessment 0 11/27/2018  8:35 AM  
Infiltration Assessment 0 11/27/2018  8:35 AM  
Dressing Status Clean, dry, & intact 11/27/2018  8:35 AM  
Dressing Type Tape;Transparent 11/27/2018  8:35 AM  
Hub Color/Line Status Infusing 11/27/2018  8:35 AM  
Alcohol Cap Used No 11/25/2018 11:46 AM  
  
 
Opportunity for questions and clarification was provided. Patient transported with: 
 O2 @ 3 liters Tech

## 2018-11-28 NOTE — CONSULTS
Geriatric Fracture Consult Patient: Jose C Kendrick  YOB: 1935   MRN: [id number] Consult Date: 11/25/2018 Consulting Physician: DR Alina Jameson Chief Complaint: LEFT INTERTROCHANTERIC HIP FRACTURE; OSTEOPOROSIS History of Present Illness: Neelam Brown is an 81 y.o.  female who is being seen for left hip pain after sustaining a fall at home when she put down a bowl of water for her cats. Onset of symptoms was abrupt with unchanged course since that time. The pain is located in the left hip. Patient describes the pain as continuous and rated as moderate. Pain has been associated with movement. Patient denies other injuries. Review of Systems: A comprehensive review of systems was negative except for that written in the History of Present Illness. ED Presentation Time: < 8 hours Mechanism of Injury: Fall from standing Ambulatory Status: Lashon Gil Past Medical History: History reviewed. No pertinent past medical history. Allergies: No Known Allergies Past Surgical History: History reviewed. No pertinent surgical history. Social History:  
Social History Socioeconomic History  Marital status:  Spouse name: Not on file  Number of children: Not on file  Years of education: Not on file  Highest education level: Not on file Social Needs  Financial resource strain: Not on file  Food insecurity - worry: Not on file  Food insecurity - inability: Not on file  Transportation needs - medical: Not on file  Transportation needs - non-medical: Not on file Occupational History  Not on file Tobacco Use  Smoking status: Current Every Day Smoker Types: Cigarettes  Smokeless tobacco: Never Used Substance and Sexual Activity  Alcohol use: No  
 Drug use: No  
 Sexual activity: Not on file Other Topics Concern  Not on file Social History Narrative  Not on file FAMILY HISTORY - REVIEWED - NO PERTINENT FAMILY HISTORY Dwelling Status: HOME WITH GRANDSON Current Anticoagulant Medications: Aspirin 81 MG/DAY History of falls: YES Prior Fractures: DENIES Osteoporosis Medications: none Bone Density Tests: UNKNOWN 
X-RAYS: Left Intertrochanteric Fracture Physical Exam:  
PATIENT COMPLAINING OF LEFT HIP PAIN AFTER A FALL AT HOME. FOCUSED MUSCULOSKELETAL EXAM REVEALS DECREASED ROM TO LEFT LE. THERE IS SHORTENING AND EXTERNAL ROTATION NOTED TO LEFT LE. PATIENT IS TENDER TO PALPATION OVER LEFT HIP AND GROIN. PATIENT HAS PAIN WITH LOG ROLLING. N/V INTACT. DENIES OTHER INJURIES. Assessment / Plan: ORIF LEFT IT FRACTURE WITH IM NAIL; CONSULT PT/OT - WBAT LEFT LE; STR 
RISKS AND BENEFITS WERE ADDRESSED WITH PATIENT AND FAMILY Labs:   
Lab Results Component Value Date/Time HGB 8.9 (L) 11/27/2018 05:20 AM  
 WBC 12.1 (H) 11/27/2018 05:20 AM  
 INR 0.9 11/25/2018 01:51 AM  
 Albumin 4.5 08/28/2018 03:30 PM  
  
Preoperative Clearance: YES by Hospitalist         
Signed by: Shruthi Chavez NP Today's Date: November 28, 2018

## (undated) DEVICE — AMD ANTIMICROBIAL GAUZE SPONGES,12 PLY USP TYPE VII, 0.2% POLYHEXAMETHYLENE BIGUANIDE HCI (PHMB): Brand: CURITY

## (undated) DEVICE — SUTURE MCRYL SZ 2-0 L27IN ABSRB VLT CT-1 L36MM 1/2 CIR TAPR Y339H

## (undated) DEVICE — 3M™ TEGADERM™ TRANSPARENT FILM DRESSING FRAME STYLE, 1628, 6 IN X 8 IN (15 CM X 20 CM), 10/CT 8CT/CASE: Brand: 3M™ TEGADERM™

## (undated) DEVICE — SURGICAL PROCEDURE PACK BASIC ST FRANCIS

## (undated) DEVICE — X-LARGE COTTON GLOVE: Brand: DEROYAL

## (undated) DEVICE — (D)PREP SKN CHLRAPRP APPL 26ML -- CONVERT TO ITEM 371833

## (undated) DEVICE — IMMOBILIZER KNEE 3 PNL 19 IN

## (undated) DEVICE — GOWN,SIRUS,NONRNF,SETINSLV,XL,20/CS: Brand: MEDLINE

## (undated) DEVICE — SOLUTION IV 1000ML 0.9% SOD CHL

## (undated) DEVICE — ROD RMR L950MM DIA2.5MM W/ EXTN BALL TIP

## (undated) DEVICE — 1010 S-DRAPE TOWEL DRAPE 10/BX: Brand: STERI-DRAPE™

## (undated) DEVICE — 2000CC GUARDIAN II: Brand: GUARDIAN

## (undated) DEVICE — REM POLYHESIVE ADULT PATIENT RETURN ELECTRODE: Brand: VALLEYLAB

## (undated) DEVICE — SLIM BODY SKIN STAPLER: Brand: APPOSE ULC

## (undated) DEVICE — SUTURE MCRYL SZ 0 L27IN ABSRB VLT CT-1 L36MM 1/2 CIR TAPR Y340H

## (undated) DEVICE — 3.2MM GUIDE WIRE 400MM

## (undated) DEVICE — (D)DRAPE ISOL ANTIMC 129X100IN -- DISC BY MFR

## (undated) DEVICE — OCCLUSIVE GAUZE STRIP,3% BISMUTH TRIBROMOPHENATE IN PETROLATUM BLEND: Brand: XEROFORM

## (undated) DEVICE — INTENDED FOR TISSUE SEPARATION, AND OTHER PROCEDURES THAT REQUIRE A SHARP SURGICAL BLADE TO PUNCTURE OR CUT.: Brand: BARD-PARKER ® STAINLESS STEEL BLADES